# Patient Record
Sex: FEMALE | Race: WHITE | HISPANIC OR LATINO | URBAN - METROPOLITAN AREA
[De-identification: names, ages, dates, MRNs, and addresses within clinical notes are randomized per-mention and may not be internally consistent; named-entity substitution may affect disease eponyms.]

---

## 2021-08-13 ENCOUNTER — EMERGENCY (EMERGENCY)
Facility: HOSPITAL | Age: 26
LOS: 1 days | Discharge: HOME | End: 2021-08-13
Attending: STUDENT IN AN ORGANIZED HEALTH CARE EDUCATION/TRAINING PROGRAM | Admitting: STUDENT IN AN ORGANIZED HEALTH CARE EDUCATION/TRAINING PROGRAM
Payer: MEDICAID

## 2021-08-13 VITALS
OXYGEN SATURATION: 99 % | DIASTOLIC BLOOD PRESSURE: 63 MMHG | RESPIRATION RATE: 14 BRPM | SYSTOLIC BLOOD PRESSURE: 97 MMHG | HEART RATE: 98 BPM | TEMPERATURE: 98 F

## 2021-08-13 LAB
HCT VFR BLD CALC: 34.2 % — LOW (ref 34.5–45)
HGB BLD-MCNC: 11.5 G/DL — SIGNIFICANT CHANGE UP (ref 11.5–15.5)
IANC: 5.74 K/UL — SIGNIFICANT CHANGE UP (ref 1.5–8.5)
MCHC RBC-ENTMCNC: 33.4 PG — SIGNIFICANT CHANGE UP (ref 27–34)
MCHC RBC-ENTMCNC: 33.6 GM/DL — SIGNIFICANT CHANGE UP (ref 32–36)
MCV RBC AUTO: 99.4 FL — SIGNIFICANT CHANGE UP (ref 80–100)
PLATELET # BLD AUTO: 180 K/UL — SIGNIFICANT CHANGE UP (ref 150–400)
RBC # BLD: 3.44 M/UL — LOW (ref 3.8–5.2)
RBC # FLD: 12.8 % — SIGNIFICANT CHANGE UP (ref 10.3–14.5)
WBC # BLD: 7.43 K/UL — SIGNIFICANT CHANGE UP (ref 3.8–10.5)
WBC # FLD AUTO: 7.43 K/UL — SIGNIFICANT CHANGE UP (ref 3.8–10.5)

## 2021-08-13 PROCEDURE — 71045 X-RAY EXAM CHEST 1 VIEW: CPT | Mod: 26

## 2021-08-13 PROCEDURE — 99285 EMERGENCY DEPT VISIT HI MDM: CPT

## 2021-08-13 RX ORDER — ALBUTEROL 90 UG/1
4 AEROSOL, METERED ORAL ONCE
Refills: 0 | Status: COMPLETED | OUTPATIENT
Start: 2021-08-13 | End: 2021-08-13

## 2021-08-13 RX ORDER — SODIUM CHLORIDE 9 MG/ML
1000 INJECTION INTRAMUSCULAR; INTRAVENOUS; SUBCUTANEOUS ONCE
Refills: 0 | Status: COMPLETED | OUTPATIENT
Start: 2021-08-13 | End: 2021-08-13

## 2021-08-13 RX ADMIN — SODIUM CHLORIDE 1000 MILLILITER(S): 9 INJECTION INTRAMUSCULAR; INTRAVENOUS; SUBCUTANEOUS at 23:16

## 2021-08-13 RX ADMIN — ALBUTEROL 4 PUFF(S): 90 AEROSOL, METERED ORAL at 23:17

## 2021-08-13 NOTE — ED PROVIDER NOTE - PHYSICAL EXAMINATION
GENERAL: Awake. Alert. NAD. Well nourished.  HEENT: NC/AT, Conjunctiva pink, no scleral icterus. Airway patent.  LUNGS: CTAB. Decreased air movement b/l. No wheezes or rales noted.  CARDIAC: Chest non-tender to palpation. RRR. S1 and S2 intact. No murmurs noted.  ABDOMEN: No masses noted. Soft, NT, ND, no rebound, no guarding.  EXT: No edema, no calf tenderness, distal pulses 2+ bilaterally, no deformities.  NEURO: A&Ox3. Moving all extremities. Sensation and strength intact throughout.   SKIN: Warm and dry.   PSYCH: Normal affect.

## 2021-08-13 NOTE — ED PROVIDER NOTE - ATTENDING CONTRIBUTION TO CARE
I have personally performed a face to face medical and diagnostic evaluation of the patient. I have discussed with and reviewed the Resident's note and agree with the History, ROS, Physical Exam and MDM unless otherwise indicated. A brief summary of my personal evaluation and impression can be found below.    25yo F  27 wks pregnant, no pmx, COVID+ 3 days of fever (resolved), cough, sob, NBNB vomtiing yesterday, and myalgias. Noticed dec fetal movement today. No cp, leg pain/swelling, abd pain, vaginal bleeding. Not vaccinated.  VITALS: Initial triage and subsequent vitals have been reviewed by me.  Gen: Well appearing, NAD, alert, non-toxic  Head: NCAT  HEENT: MMM, normal conjunctiva, anicteric, neck supple  Lung: CTAB, no adventitious sounds  CV: RRR, no murmurs, 2+symmetric peripheral pulses  Abd: soft, NTND, no rebound or guarding, no palpable masses  MSK: No edema, no visible deformities  Neuro: Moving all extremity grossly, following commands appropriately, fluid speech  Skin: Warm and dry, no evidence of rash  Psych: normal mood and affect  COVID+ constitution of viral symptoms will get labs, ivf, cxr, and  huddle for NST given dec fetal movement.

## 2021-08-13 NOTE — ED ADULT TRIAGE NOTE - CHIEF COMPLAINT QUOTE
Pt is 27wks pregnant, presents to ED for SOB, fevers and decreased fetal movement since this morning. Pt states she tested positive for COVID 2 days ago. L&D triage RN called and states pt should be seen in ED.

## 2021-08-13 NOTE — ED PROVIDER NOTE - PROGRESS NOTE DETAILS
CAMILLA MARIO:  OB/GYN recommends no further intervention.  Ambulatory pulse ox 100%.  Pt medically stable for discharge.  Strict return precautions given.  Pt to follow up with PMD and OB/GYN.  Reassessment performed and plan for discharge discussed with Dr. Ortega who agrees with disposition and discharge plan. CAMILLA MARIO:  OB/GYN recommends no further intervention.  Ambulatory pulse ox 100% per ED Tech Teofilo Gonzalez.  Pt medically stable for discharge.  Strict return precautions given.  Pt to follow up with PMD and OB/GYN.  Reassessment performed and plan for discharge discussed with Dr. Ortega who agrees with disposition and discharge plan.  [employer]  ID#[id number].

## 2021-08-13 NOTE — ED PROVIDER NOTE - OBJECTIVE STATEMENT
26F  27 weeks pregnant, no significant PMH presenting for 3 days of SOB, productive cough (white sputum with small streaks of blood). Associated with fever, chills, nausea, NBNB vomiting, headache, myalgias. Pt tested positive for covid yesterday, noticed decreased fetal movement today. Denies neck pain, calf pain, calf swelling. Follows with OBGYN at [facility], wants to switch care to [facility] so she decided to come here.    Fetal HR done at bedside, 130.

## 2021-08-13 NOTE — ED PROVIDER NOTE - NSFOLLOWUPINSTRUCTIONS_ED_ALL_ED_FT
Advance activity as tolerated.  Continue all previously prescribed medications as directed unless otherwise instructed.  Take Tylenol 650mg (Two 325 mg pills) every 4-6 hours as needed for pain or fevers.  Use Albuterol Inhaler 2 puffs every 4 to 6 hours as needed for shortness of breath and/or wheezing.  Follow up with your primary care physician in 48-72 hours- bring copies of your results.  Return to the ER for worsening or persistent symptoms, including but not limited to worsening/persistent fevers, chest pain, shortness of breath, passing out, and/or ANY NEW OR CONCERNING SYMPTOMS. If you have issues obtaining follow up, please call: [phone] to obtain a doctor or specialist who takes your insurance in your area.  You may call [phone] to make an appointment with the internal medicine clinic.     You are suspected to have or have been diagnosed with the COVID-19 virus. You must self quarantine to complete a 14 day time period.  Monitor for fevers, shortness of breath and cough primarily.  Monitor your temperature daily to not any changes and increases.    It has been determined that you do not need hospitalization at this time and can recover while remaining in self-quarantine at home. You should follow the prevention steps below until a healthcare provider or local or state health department says you can return to your normal activities.  1. You should restrict activities outside your home, except for getting medical care.  2. Do not go to work, school, or public areas.  3. Avoid using public transportation, ride-sharing, or taxis.  4. Separate yourself from other people and animals in your home.  5. Call ahead before visiting your doctor.  6. Wear a facemask.  7. Cover your coughs and sneezes.  8. Clean your hands often.  9. Avoid sharing personal household items.  10. Clean all “high-touch” surfaces everyday.  11. Monitor your symptoms.  If you have a medical emergency and need to call 911, notify the dispatch personnel that you have COVID-19 If possible, put on a facemask before emergency medical services arrive.  12. Stopping home isolation.  Patients with confirmed COVID-19 should remain under home isolation precautions for 14 days since the positive COVID-19 test and until the risk of secondary transmission to others is thought to be low. The decision to discontinue home isolation precautions should be made on a case-by-case basis, in consultation with healthcare providers and state and local health departments. Your [facility] Department of Health can be reached at [phone] for further information about COVID-19.

## 2021-08-13 NOTE — ED ADULT NURSE NOTE - OBJECTIVE STATEMENT
Pt received to intake rm 8 c/o SOB x 3 days. AxOx4 and ambulatory at baseline, pt primarily Greenlandic speaking,  utilized at bedside by provider. Pt is ~27 weeks pregnant with first pregnancy, c/o SOB and productive cough at this time, pt tested covid + yesterday. Pt endorses decreased fetal movement today. Pt appears comfortable, in NAD, respirations even/unlabored, VS reassessed. Pt denies any PMH. 20G IV placed to L AC, labs drawn and sent, pt medicated as per orders, will continue to monitor.

## 2021-08-13 NOTE — ED PROVIDER NOTE - PATIENT PORTAL LINK FT
You can access the FollowMyHealth Patient Portal offered by [facility] by registering at the following website: [email or web address]. By joining [facility]’s FollowMyHealth portal, you will also be able to view your health information using other applications (apps) compatible with our system.

## 2021-08-13 NOTE — ED PROVIDER NOTE - CLINICAL SUMMARY MEDICAL DECISION MAKING FREE TEXT BOX
26F  27 weeks pregnant, no significant PMH presenting for 3 days of SOB, productive cough (white sputum with small streaks of blood). Associated with fever, chills, nausea, NBNB vomiting, headache, myalgias. Decreased fetal movement today.  HR 98, O2 100% on RA. Fetal . Lungs CTA, decreased air movement b/l.  COVID vs pneumonia vs URI  CXR, labs  Albuterol nebs  OB huddle  reassess

## 2021-08-13 NOTE — CONSULT NOTE ADULT - ASSESSMENT
25 y/o  @ 27w5d GA, presents with symptomatic covid and c/o decreased FM. Patient saturating well on room air. Other vitals stable. Maternal and fetal status reassuring.     -Patient cleared for d/c from an OB perspective   -Patient to follow-up in the office once she completes the appropriate period of quarantine   -Return precautions provided    D/w Dr. Cohlo Antunez PGY-4

## 2021-08-13 NOTE — CONSULT NOTE ADULT - SUBJECTIVE AND OBJECTIVE BOX
R4 GYN ED CONSULT NOTE    HPI:  27yo  @ 27w5d GA by EMRE 2021, PNC c/b covid positive status confirmed today per patient, presents c/o decreased FM. Patient reports she has had     Pt denies fever, chills, nausea, vomiting, diarrhea, headache, constipation, dizzyness, syncope, chest pain, palpitations, shortness of breath, dysuria, urgency, frequency, abdominal/pelvic pain, vaginal bleeding/discharge/odor/pain/itching, breast lumps/bumps, dyspareunia.    In ED today    Primary OB/GYN:    OBH:  GYNH: denies hx of fibroids, ov cysts, abnl paps, sti  PMSH:  MEDS: none  ALL: nkda  SOCH: denies t/e/d; safe at home  FAMH: denies fam hx of breast/uterine/ovarian/colon cancer    ROS: negative except per hpi    OBJECTIVE:    VITAL SIGNS:  Vital Signs Last 24 Hrs  T(C): 36.8 (13 Aug 2021 20:11), Max: 36.8 (13 Aug 2021 20:11)  T(F): 98.3 (13 Aug 2021 20:11), Max: 98.3 (13 Aug 2021 20:11)  HR: 89 (13 Aug 2021 23:23) (89 - 98)  BP: 105/56 (13 Aug 2021 23:23) (97/63 - 105/56)  BP(mean): --  RR: 16 (13 Aug 2021 23:23) (14 - 16)  SpO2: 99% (13 Aug 2021 23:23) (99% - 99%)    CAPILLARY BLOOD GLUCOSE            PHYSICAL EXAM:  GEN: NAD, A&Ox3  CV: RRR  LUNGS: CTAB  ABD: soft, NT, ND, +BS  SPECULUM:  PELVIC:  EXT: No LE edema/tenderness    LABS:                        11.5   7.43  )-----------( 180      ( 13 Aug 2021 23:41 )             34.2   baso x      eos x      imm gran x      lymph x      mono x      poly x                      RADIOLOGY: R4 GYN ED CONSULT NOTE    HPI:  27y/o  @ 27w5d GA by EMRE 2021, PNC c/b covid positive status confirmed today per patient, presents c/o decreased FM. Patient reports she has had     Pt denies fever, chills, nausea, vomiting, diarrhea, headache, constipation, dizzyness, syncope, chest pain, palpitations, shortness of breath, dysuria, urgency, frequency, abdominal/pelvic pain, vaginal bleeding/discharge/odor/pain/itching, breast lumps/bumps, dyspareunia.    In ED today    Primary OB/GYN:    OBH:  GYNH: denies hx of fibroids, ov cysts, abnl paps, sti  PMSH:  MEDS: none  ALL: nkda  SOCH: denies t/e/d; safe at home  FAMH: denies fam hx of breast/uterine/ovarian/colon cancer    ROS: negative except per hpi    OBJECTIVE:    VITAL SIGNS:  Vital Signs Last 24 Hrs  T(C): 36.8 (13 Aug 2021 20:11), Max: 36.8 (13 Aug 2021 20:11)  T(F): 98.3 (13 Aug 2021 20:11), Max: 98.3 (13 Aug 2021 20:11)  HR: 89 (13 Aug 2021 23:23) (89 - 98)  BP: 105/56 (13 Aug 2021 23:23) (97/63 - 105/56)  BP(mean): --  RR: 16 (13 Aug 2021 23:23) (14 - 16)  SpO2: 99% (13 Aug 2021 23:23) (99% - 99%)    CAPILLARY BLOOD GLUCOSE            PHYSICAL EXAM:  GEN: NAD, A&Ox3  CV: RRR  LUNGS: CTAB  ABD: soft, NT, ND, +BS  SPECULUM:  PELVIC:  EXT: No LE edema/tenderness    LABS:                        11.5   7.43  )-----------( 180      ( 13 Aug 2021 23:41 )             34.2   baso x      eos x      imm gran x      lymph x      mono x      poly x                      RADIOLOGY: R4 GYN ED CONSULT NOTE    HPI:  27 y/o  @ 27w5d GA by EMRE 2021, PNC c/b covid positive status confirmed today per patient, presents c/o decreased FM. Patient reports a 3-day history of SOB, malaise, generalized body aches, and fever. [facility] 2 days ago and was told today her result was positive. She denies associated palpitations and chest pain. She had an episode of emesis yesterday. Denies other OB complaints - no VB, LOF, ctx,.     PNC significant for anemia, patient on iron.     Primary OB/GYN: clinic at [facility]; patient made an appointment at the [facility] clinic  in order to transfer her care    OBH: G1  GYNH: denies hx of fibroids ovarian cysts, STIs; +abnormal pap this pregnancy, she will f/u postpartum  PMSH: fatty liver  MEDS: PNV, iron  ALL: nkda  SOCH: denies tobacco/drug/alcohol use    ROS: negative except per hpi    OBJECTIVE:    VITAL SIGNS:  Vital Signs Last 24 Hrs  T(C): 36.8 (13 Aug 2021 20:11), Max: 36.8 (13 Aug 2021 20:11)  T(F): 98.3 (13 Aug 2021 20:11), Max: 98.3 (13 Aug 2021 20:11)  HR: 89 (13 Aug 2021 23:23) (89 - 98)  BP: 105/56 (13 Aug 2021 23:23) (97/63 - 105/56)  BP(mean): --  RR: 16 (13 Aug 2021 23:23) (14 - 16)  SpO2: 99% (13 Aug 2021 23:23) (99% - 99%)      PHYSICAL EXAM:  GEN: NAD, A&Ox3  ABD: soft, NT, gravid  PELVIC: intentionally deferred  EXT: No LE edema/tenderness  NST: reactive - 130/mod/+accels/-deccels  BSS: breech, anterior fundal placenta, FHR+, BPP 8/8      LABS:                        11.5   7.43  )-----------( 180      ( 13 Aug 2021 23:41 )             34.2   baso x      eos x      imm gran x      lymph x      mono x      poly x

## 2021-08-14 VITALS — OXYGEN SATURATION: 100 % | HEART RATE: 98 BPM | RESPIRATION RATE: 16 BRPM

## 2021-08-14 LAB
ALBUMIN SERPL ELPH-MCNC: 3.4 G/DL — SIGNIFICANT CHANGE UP (ref 3.3–5)
ALP SERPL-CCNC: 131 U/L — HIGH (ref 40–120)
ALT FLD-CCNC: 21 U/L — SIGNIFICANT CHANGE UP (ref 4–33)
ANION GAP SERPL CALC-SCNC: 13 MMOL/L — SIGNIFICANT CHANGE UP (ref 7–14)
APPEARANCE UR: ABNORMAL
AST SERPL-CCNC: 20 U/L — SIGNIFICANT CHANGE UP (ref 4–32)
BACTERIA # UR AUTO: NEGATIVE — SIGNIFICANT CHANGE UP
BASOPHILS # BLD AUTO: 0.02 K/UL — SIGNIFICANT CHANGE UP (ref 0–0.2)
BASOPHILS NFR BLD AUTO: 0.3 % — SIGNIFICANT CHANGE UP (ref 0–2)
BILIRUB SERPL-MCNC: 0.3 MG/DL — SIGNIFICANT CHANGE UP (ref 0.2–1.2)
BILIRUB UR-MCNC: NEGATIVE — SIGNIFICANT CHANGE UP
BUN SERPL-MCNC: 8 MG/DL — SIGNIFICANT CHANGE UP (ref 7–23)
CALCIUM SERPL-MCNC: 9.1 MG/DL — SIGNIFICANT CHANGE UP (ref 8.4–10.5)
CHLORIDE SERPL-SCNC: 103 MMOL/L — SIGNIFICANT CHANGE UP (ref 98–107)
CO2 SERPL-SCNC: 18 MMOL/L — LOW (ref 22–31)
COLOR SPEC: YELLOW — SIGNIFICANT CHANGE UP
CREAT SERPL-MCNC: 0.45 MG/DL — LOW (ref 0.5–1.3)
DIFF PNL FLD: NEGATIVE — SIGNIFICANT CHANGE UP
EOSINOPHIL # BLD AUTO: 0.08 K/UL — SIGNIFICANT CHANGE UP (ref 0–0.5)
EOSINOPHIL NFR BLD AUTO: 1.1 % — SIGNIFICANT CHANGE UP (ref 0–6)
EPI CELLS # UR: 3 /HPF — SIGNIFICANT CHANGE UP (ref 0–5)
GLUCOSE SERPL-MCNC: 91 MG/DL — SIGNIFICANT CHANGE UP (ref 70–99)
GLUCOSE UR QL: NEGATIVE — SIGNIFICANT CHANGE UP
HYALINE CASTS # UR AUTO: 0 /LPF — SIGNIFICANT CHANGE UP (ref 0–7)
IMM GRANULOCYTES NFR BLD AUTO: 2.3 % — HIGH (ref 0–1.5)
KETONES UR-MCNC: NEGATIVE — SIGNIFICANT CHANGE UP
LEUKOCYTE ESTERASE UR-ACNC: NEGATIVE — SIGNIFICANT CHANGE UP
LYMPHOCYTES # BLD AUTO: 1.03 K/UL — SIGNIFICANT CHANGE UP (ref 1–3.3)
LYMPHOCYTES # BLD AUTO: 13.9 % — SIGNIFICANT CHANGE UP (ref 13–44)
MONOCYTES # BLD AUTO: 0.39 K/UL — SIGNIFICANT CHANGE UP (ref 0–0.9)
MONOCYTES NFR BLD AUTO: 5.2 % — SIGNIFICANT CHANGE UP (ref 2–14)
NEUTROPHILS # BLD AUTO: 5.74 K/UL — SIGNIFICANT CHANGE UP (ref 1.8–7.4)
NEUTROPHILS NFR BLD AUTO: 77.2 % — HIGH (ref 43–77)
NITRITE UR-MCNC: NEGATIVE — SIGNIFICANT CHANGE UP
NRBC # BLD: 0 /100 WBCS — SIGNIFICANT CHANGE UP
NRBC # FLD: 0 K/UL — SIGNIFICANT CHANGE UP
PH UR: 7.5 — SIGNIFICANT CHANGE UP (ref 5–8)
POTASSIUM SERPL-MCNC: 3.6 MMOL/L — SIGNIFICANT CHANGE UP (ref 3.5–5.3)
POTASSIUM SERPL-SCNC: 3.6 MMOL/L — SIGNIFICANT CHANGE UP (ref 3.5–5.3)
PROT SERPL-MCNC: 6.3 G/DL — SIGNIFICANT CHANGE UP (ref 6–8.3)
PROT UR-MCNC: ABNORMAL
RBC CASTS # UR COMP ASSIST: 1 /HPF — SIGNIFICANT CHANGE UP (ref 0–4)
SODIUM SERPL-SCNC: 134 MMOL/L — LOW (ref 135–145)
SP GR SPEC: 1.02 — SIGNIFICANT CHANGE UP (ref 1.01–1.02)
UROBILINOGEN FLD QL: SIGNIFICANT CHANGE UP
WBC UR QL: 3 /HPF — SIGNIFICANT CHANGE UP (ref 0–5)

## 2021-08-14 NOTE — PROVIDER CONTACT NOTE (OTHER) - ASSESSMENT
FHR baseline 130, moderate variability, + accels, - decels, no contractions noted.  Pt denies feeling contractions

## 2021-08-15 LAB
CULTURE RESULTS: SIGNIFICANT CHANGE UP
SPECIMEN SOURCE: SIGNIFICANT CHANGE UP

## 2021-08-18 ENCOUNTER — APPOINTMENT (OUTPATIENT)
Dept: INTERNAL MEDICINE | Facility: CLINIC | Age: 26
End: 2021-08-18

## 2021-08-20 ENCOUNTER — APPOINTMENT (OUTPATIENT)
Dept: INTERNAL MEDICINE | Facility: CLINIC | Age: 26
End: 2021-08-20

## 2021-08-24 ENCOUNTER — OUTPATIENT (OUTPATIENT)
Dept: OUTPATIENT SERVICES | Facility: HOSPITAL | Age: 26
LOS: 1 days | End: 2021-08-24

## 2021-08-24 ENCOUNTER — RESULT REVIEW (OUTPATIENT)
Age: 26
End: 2021-08-24

## 2021-08-24 ENCOUNTER — NON-APPOINTMENT (OUTPATIENT)
Age: 26
End: 2021-08-24

## 2021-08-24 ENCOUNTER — APPOINTMENT (OUTPATIENT)
Dept: OBGYN | Facility: HOSPITAL | Age: 26
End: 2021-08-24

## 2021-08-24 VITALS
DIASTOLIC BLOOD PRESSURE: 62 MMHG | SYSTOLIC BLOOD PRESSURE: 101 MMHG | BODY MASS INDEX: 27.49 KG/M2 | TEMPERATURE: 98.1 F | WEIGHT: 161 LBS | HEART RATE: 89 BPM | HEIGHT: 64 IN

## 2021-08-24 DIAGNOSIS — Z78.9 OTHER SPECIFIED HEALTH STATUS: ICD-10-CM

## 2021-08-24 DIAGNOSIS — Z23 ENCOUNTER FOR IMMUNIZATION: ICD-10-CM

## 2021-08-24 DIAGNOSIS — Z56.0 UNEMPLOYMENT, UNSPECIFIED: ICD-10-CM

## 2021-08-24 DIAGNOSIS — Z80.0 FAMILY HISTORY OF MALIGNANT NEOPLASM OF DIGESTIVE ORGANS: ICD-10-CM

## 2021-08-24 LAB
A1C WITH ESTIMATED AVERAGE GLUCOSE RESULT: 5.2 % — SIGNIFICANT CHANGE UP (ref 4–5.6)
ALBUMIN SERPL ELPH-MCNC: 3.7 G/DL — SIGNIFICANT CHANGE UP (ref 3.3–5)
ALP SERPL-CCNC: 166 U/L — HIGH (ref 40–120)
ALT FLD-CCNC: 29 U/L — SIGNIFICANT CHANGE UP (ref 4–33)
ANION GAP SERPL CALC-SCNC: 17 MMOL/L — HIGH (ref 7–14)
APPEARANCE UR: CLEAR — SIGNIFICANT CHANGE UP
AST SERPL-CCNC: 17 U/L — SIGNIFICANT CHANGE UP (ref 4–32)
BACTERIA # UR AUTO: NEGATIVE — SIGNIFICANT CHANGE UP
BASOPHILS # BLD AUTO: 0.03 K/UL — SIGNIFICANT CHANGE UP (ref 0–0.2)
BASOPHILS NFR BLD AUTO: 0.3 % — SIGNIFICANT CHANGE UP (ref 0–2)
BILIRUB SERPL-MCNC: 0.3 MG/DL — SIGNIFICANT CHANGE UP (ref 0.2–1.2)
BILIRUB UR-MCNC: NEGATIVE — SIGNIFICANT CHANGE UP
BUN SERPL-MCNC: 6 MG/DL — LOW (ref 7–23)
CALCIUM SERPL-MCNC: 9.3 MG/DL — SIGNIFICANT CHANGE UP (ref 8.4–10.5)
CHLORIDE SERPL-SCNC: 104 MMOL/L — SIGNIFICANT CHANGE UP (ref 98–107)
CO2 SERPL-SCNC: 17 MMOL/L — LOW (ref 22–31)
COLOR SPEC: YELLOW — SIGNIFICANT CHANGE UP
CREAT SERPL-MCNC: 0.46 MG/DL — LOW (ref 0.5–1.3)
DIFF PNL FLD: NEGATIVE — SIGNIFICANT CHANGE UP
EOSINOPHIL # BLD AUTO: 0.08 K/UL — SIGNIFICANT CHANGE UP (ref 0–0.5)
EOSINOPHIL NFR BLD AUTO: 0.9 % — SIGNIFICANT CHANGE UP (ref 0–6)
EPI CELLS # UR: 6 /HPF — HIGH (ref 0–5)
ESTIMATED AVERAGE GLUCOSE: 103 — SIGNIFICANT CHANGE UP
GLUCOSE 1H P MEAL SERPL-MCNC: 96 MG/DL — SIGNIFICANT CHANGE UP (ref 70–134)
GLUCOSE SERPL-MCNC: 97 MG/DL — SIGNIFICANT CHANGE UP (ref 70–99)
GLUCOSE UR QL: NEGATIVE — SIGNIFICANT CHANGE UP
HCT VFR BLD CALC: 35.5 % — SIGNIFICANT CHANGE UP (ref 34.5–45)
HGB BLD-MCNC: 12 G/DL — SIGNIFICANT CHANGE UP (ref 11.5–15.5)
HIV 1+2 AB+HIV1 P24 AG SERPL QL IA: SIGNIFICANT CHANGE UP
HYALINE CASTS # UR AUTO: 1 /LPF — SIGNIFICANT CHANGE UP (ref 0–7)
IANC: 6.91 K/UL — SIGNIFICANT CHANGE UP (ref 1.5–8.5)
IMM GRANULOCYTES NFR BLD AUTO: 3.9 % — HIGH (ref 0–1.5)
KETONES UR-MCNC: NEGATIVE — SIGNIFICANT CHANGE UP
LEUKOCYTE ESTERASE UR-ACNC: NEGATIVE — SIGNIFICANT CHANGE UP
LYMPHOCYTES # BLD AUTO: 1.07 K/UL — SIGNIFICANT CHANGE UP (ref 1–3.3)
LYMPHOCYTES # BLD AUTO: 12 % — LOW (ref 13–44)
MCHC RBC-ENTMCNC: 33.8 GM/DL — SIGNIFICANT CHANGE UP (ref 32–36)
MCHC RBC-ENTMCNC: 33.9 PG — SIGNIFICANT CHANGE UP (ref 27–34)
MCV RBC AUTO: 100.3 FL — HIGH (ref 80–100)
MONOCYTES # BLD AUTO: 0.49 K/UL — SIGNIFICANT CHANGE UP (ref 0–0.9)
MONOCYTES NFR BLD AUTO: 5.5 % — SIGNIFICANT CHANGE UP (ref 2–14)
NEUTROPHILS # BLD AUTO: 6.91 K/UL — SIGNIFICANT CHANGE UP (ref 1.8–7.4)
NEUTROPHILS NFR BLD AUTO: 77.4 % — HIGH (ref 43–77)
NITRITE UR-MCNC: NEGATIVE — SIGNIFICANT CHANGE UP
NRBC # BLD: 0 /100 WBCS — SIGNIFICANT CHANGE UP
NRBC # FLD: 0 K/UL — SIGNIFICANT CHANGE UP
PH UR: 6.5 — SIGNIFICANT CHANGE UP (ref 5–8)
PLATELET # BLD AUTO: 327 K/UL — SIGNIFICANT CHANGE UP (ref 150–400)
POTASSIUM SERPL-MCNC: 3.3 MMOL/L — LOW (ref 3.5–5.3)
POTASSIUM SERPL-SCNC: 3.3 MMOL/L — LOW (ref 3.5–5.3)
PROT SERPL-MCNC: 6.3 G/DL — SIGNIFICANT CHANGE UP (ref 6–8.3)
PROT UR-MCNC: ABNORMAL
RBC # BLD: 3.54 M/UL — LOW (ref 3.8–5.2)
RBC # FLD: 12.5 % — SIGNIFICANT CHANGE UP (ref 10.3–14.5)
RBC CASTS # UR COMP ASSIST: 4 /HPF — SIGNIFICANT CHANGE UP (ref 0–4)
SODIUM SERPL-SCNC: 138 MMOL/L — SIGNIFICANT CHANGE UP (ref 135–145)
SP GR SPEC: 1.02 — SIGNIFICANT CHANGE UP (ref 1.01–1.02)
T4 FREE SERPL-MCNC: 0.9 NG/DL — SIGNIFICANT CHANGE UP (ref 0.9–1.8)
TSH SERPL-MCNC: 2.7 UIU/ML — SIGNIFICANT CHANGE UP (ref 0.27–4.2)
URATE SERPL-MCNC: 3.8 MG/DL — SIGNIFICANT CHANGE UP (ref 2.5–7)
UROBILINOGEN FLD QL: SIGNIFICANT CHANGE UP
WBC # BLD: 8.93 K/UL — SIGNIFICANT CHANGE UP (ref 3.8–10.5)
WBC # FLD AUTO: 8.93 K/UL — SIGNIFICANT CHANGE UP (ref 3.8–10.5)
WBC UR QL: 6 /HPF — HIGH (ref 0–5)

## 2021-08-24 SDOH — ECONOMIC STABILITY - INCOME SECURITY: UNEMPLOYMENT, UNSPECIFIED: Z56.0

## 2021-08-25 DIAGNOSIS — Z34.90 ENCOUNTER FOR SUPERVISION OF NORMAL PREGNANCY, UNSPECIFIED, UNSPECIFIED TRIMESTER: ICD-10-CM

## 2021-08-25 DIAGNOSIS — Z78.9 OTHER SPECIFIED HEALTH STATUS: ICD-10-CM

## 2021-08-25 DIAGNOSIS — Z34.03 ENCOUNTER FOR SUPERVISION OF NORMAL FIRST PREGNANCY, THIRD TRIMESTER: ICD-10-CM

## 2021-08-25 DIAGNOSIS — R87.610 ATYPICAL SQUAMOUS CELLS OF UNDETERMINED SIGNIFICANCE ON CYTOLOGIC SMEAR OF CERVIX (ASC-US): ICD-10-CM

## 2021-08-25 DIAGNOSIS — R74.8 ABNORMAL LEVELS OF OTHER SERUM ENZYMES: ICD-10-CM

## 2021-08-25 DIAGNOSIS — Z23 ENCOUNTER FOR IMMUNIZATION: ICD-10-CM

## 2021-08-25 LAB
C TRACH RRNA SPEC QL NAA+PROBE: SIGNIFICANT CHANGE UP
COVID-19 NUCLEOCAPSID GAM AB INTERP: POSITIVE
COVID-19 NUCLEOCAPSID TOTAL GAM ANTIBODY RESULT: 14.4 INDEX — HIGH
CULTURE RESULTS: SIGNIFICANT CHANGE UP
HBV SURFACE AG SER-ACNC: SIGNIFICANT CHANGE UP
HCV AB S/CO SERPL IA: 0.12 S/CO — SIGNIFICANT CHANGE UP (ref 0–0.99)
HCV AB SERPL-IMP: SIGNIFICANT CHANGE UP
HEMOGLOBIN INTERPRETATION: SIGNIFICANT CHANGE UP
HGB A MFR BLD: 96.8 % — SIGNIFICANT CHANGE UP
HGB A2 MFR BLD: 2.8 % — SIGNIFICANT CHANGE UP (ref 2.4–3.5)
HGB F MFR BLD: <1 % — SIGNIFICANT CHANGE UP (ref 0–1.5)
LEAD BLD-MCNC: 1 UG/DL — SIGNIFICANT CHANGE UP (ref 0–4)
MEV IGG SER-ACNC: 19.2 AU/ML — SIGNIFICANT CHANGE UP
MEV IGG+IGM SER-IMP: POSITIVE — SIGNIFICANT CHANGE UP
N GONORRHOEA RRNA SPEC QL NAA+PROBE: SIGNIFICANT CHANGE UP
RUBV IGG SER-ACNC: 1.2 INDEX — SIGNIFICANT CHANGE UP
RUBV IGG SER-IMP: POSITIVE — SIGNIFICANT CHANGE UP
SARS-COV-2 IGG+IGM SERPL QL IA: 14.4 INDEX — HIGH
SARS-COV-2 IGG+IGM SERPL QL IA: POSITIVE
SPECIMEN SOURCE: SIGNIFICANT CHANGE UP
SPECIMEN SOURCE: SIGNIFICANT CHANGE UP
T PALLIDUM AB TITR SER: NEGATIVE — SIGNIFICANT CHANGE UP
VZV IGG FLD QL IA: 1709 INDEX — SIGNIFICANT CHANGE UP
VZV IGG FLD QL IA: POSITIVE — SIGNIFICANT CHANGE UP

## 2021-08-26 LAB
GAMMA INTERFERON BACKGROUND BLD IA-ACNC: 0.02 IU/ML — SIGNIFICANT CHANGE UP
M TB IFN-G BLD-IMP: NEGATIVE — SIGNIFICANT CHANGE UP
M TB IFN-G CD4+ BCKGRND COR BLD-ACNC: 0 IU/ML — SIGNIFICANT CHANGE UP
M TB IFN-G CD4+CD8+ BCKGRND COR BLD-ACNC: 0 IU/ML — SIGNIFICANT CHANGE UP
QUANT TB PLUS MITOGEN MINUS NIL: 2.03 IU/ML — SIGNIFICANT CHANGE UP

## 2021-08-27 LAB — 24R-OH-CALCIDIOL SERPL-MCNC: 26.4 NG/ML — LOW (ref 30–80)

## 2021-09-01 ENCOUNTER — NON-APPOINTMENT (OUTPATIENT)
Age: 26
End: 2021-09-01

## 2021-09-02 ENCOUNTER — APPOINTMENT (OUTPATIENT)
Dept: ANTEPARTUM | Facility: CLINIC | Age: 26
End: 2021-09-02
Payer: MEDICAID

## 2021-09-02 ENCOUNTER — ASOB RESULT (OUTPATIENT)
Age: 26
End: 2021-09-02

## 2021-09-02 PROCEDURE — 76805 OB US >/= 14 WKS SNGL FETUS: CPT | Mod: 26

## 2021-09-05 LAB — CYSTIC FIBROSIS EXPANDED PANEL: SIGNIFICANT CHANGE UP

## 2021-09-07 ENCOUNTER — NON-APPOINTMENT (OUTPATIENT)
Age: 26
End: 2021-09-07

## 2021-09-08 ENCOUNTER — APPOINTMENT (OUTPATIENT)
Dept: OBGYN | Facility: HOSPITAL | Age: 26
End: 2021-09-08

## 2021-09-08 ENCOUNTER — OUTPATIENT (OUTPATIENT)
Dept: OUTPATIENT SERVICES | Facility: HOSPITAL | Age: 26
LOS: 1 days | End: 2021-09-08

## 2021-09-08 VITALS
DIASTOLIC BLOOD PRESSURE: 65 MMHG | HEART RATE: 91 BPM | BODY MASS INDEX: 28.32 KG/M2 | TEMPERATURE: 98.4 F | WEIGHT: 165 LBS | SYSTOLIC BLOOD PRESSURE: 108 MMHG

## 2021-09-08 DIAGNOSIS — E55.9 VITAMIN D DEFICIENCY, UNSPECIFIED: ICD-10-CM

## 2021-09-08 RX ORDER — CHROMIUM 200 MCG
25 MCG TABLET ORAL
Qty: 30 | Refills: 3 | Status: ACTIVE | COMMUNITY
Start: 2021-09-08 | End: 1900-01-01

## 2021-09-08 RX ORDER — FAMOTIDINE 20 MG/1
20 TABLET, FILM COATED ORAL DAILY
Qty: 30 | Refills: 3 | Status: ACTIVE | COMMUNITY
Start: 2021-09-08 | End: 1900-01-01

## 2021-09-09 DIAGNOSIS — E55.9 VITAMIN D DEFICIENCY, UNSPECIFIED: ICD-10-CM

## 2021-09-09 DIAGNOSIS — Z34.03 ENCOUNTER FOR SUPERVISION OF NORMAL FIRST PREGNANCY, THIRD TRIMESTER: ICD-10-CM

## 2021-09-23 ENCOUNTER — APPOINTMENT (OUTPATIENT)
Dept: OBGYN | Facility: HOSPITAL | Age: 26
End: 2021-09-23

## 2021-09-28 ENCOUNTER — APPOINTMENT (OUTPATIENT)
Dept: OBGYN | Facility: HOSPITAL | Age: 26
End: 2021-09-28

## 2021-09-28 ENCOUNTER — NON-APPOINTMENT (OUTPATIENT)
Age: 26
End: 2021-09-28

## 2021-09-28 ENCOUNTER — OUTPATIENT (OUTPATIENT)
Dept: OUTPATIENT SERVICES | Facility: HOSPITAL | Age: 26
LOS: 1 days | End: 2021-09-28

## 2021-09-28 VITALS
HEART RATE: 89 BPM | HEIGHT: 64 IN | BODY MASS INDEX: 28.42 KG/M2 | DIASTOLIC BLOOD PRESSURE: 62 MMHG | TEMPERATURE: 98.2 F | SYSTOLIC BLOOD PRESSURE: 105 MMHG | WEIGHT: 166.5 LBS

## 2021-09-28 DIAGNOSIS — Z34.03 ENCOUNTER FOR SUPERVISION OF NORMAL FIRST PREGNANCY, THIRD TRIMESTER: ICD-10-CM

## 2021-10-28 ENCOUNTER — APPOINTMENT (OUTPATIENT)
Dept: ANTEPARTUM | Facility: CLINIC | Age: 26
End: 2021-10-28
Payer: MEDICAID

## 2021-10-28 ENCOUNTER — RESULT REVIEW (OUTPATIENT)
Age: 26
End: 2021-10-28

## 2021-10-28 ENCOUNTER — OUTPATIENT (OUTPATIENT)
Dept: OUTPATIENT SERVICES | Facility: HOSPITAL | Age: 26
LOS: 1 days | End: 2021-10-28

## 2021-10-28 ENCOUNTER — APPOINTMENT (OUTPATIENT)
Dept: OBGYN | Facility: HOSPITAL | Age: 26
End: 2021-10-28

## 2021-10-28 ENCOUNTER — ASOB RESULT (OUTPATIENT)
Age: 26
End: 2021-10-28

## 2021-10-28 VITALS — HEART RATE: 73 BPM | SYSTOLIC BLOOD PRESSURE: 111 MMHG | DIASTOLIC BLOOD PRESSURE: 79 MMHG

## 2021-10-28 DIAGNOSIS — Z34.03 ENCOUNTER FOR SUPERVISION OF NORMAL FIRST PREGNANCY, THIRD TRIMESTER: ICD-10-CM

## 2021-10-28 LAB
BASOPHILS # BLD AUTO: 0.04 K/UL — SIGNIFICANT CHANGE UP (ref 0–0.2)
BASOPHILS NFR BLD AUTO: 0.4 % — SIGNIFICANT CHANGE UP (ref 0–2)
EOSINOPHIL # BLD AUTO: 0.12 K/UL — SIGNIFICANT CHANGE UP (ref 0–0.5)
EOSINOPHIL NFR BLD AUTO: 1.3 % — SIGNIFICANT CHANGE UP (ref 0–6)
HCT VFR BLD CALC: 39.9 % — SIGNIFICANT CHANGE UP (ref 34.5–45)
HGB BLD-MCNC: 14.2 G/DL — SIGNIFICANT CHANGE UP (ref 11.5–15.5)
HIV 1+2 AB+HIV1 P24 AG SERPL QL IA: SIGNIFICANT CHANGE UP
IANC: 7.83 K/UL — SIGNIFICANT CHANGE UP (ref 1.5–8.5)
IMM GRANULOCYTES NFR BLD AUTO: 0.9 % — SIGNIFICANT CHANGE UP (ref 0–1.5)
LYMPHOCYTES # BLD AUTO: 0.72 K/UL — LOW (ref 1–3.3)
LYMPHOCYTES # BLD AUTO: 7.8 % — LOW (ref 13–44)
MCHC RBC-ENTMCNC: 34.1 PG — HIGH (ref 27–34)
MCHC RBC-ENTMCNC: 35.6 GM/DL — SIGNIFICANT CHANGE UP (ref 32–36)
MCV RBC AUTO: 95.9 FL — SIGNIFICANT CHANGE UP (ref 80–100)
MONOCYTES # BLD AUTO: 0.49 K/UL — SIGNIFICANT CHANGE UP (ref 0–0.9)
MONOCYTES NFR BLD AUTO: 5.3 % — SIGNIFICANT CHANGE UP (ref 2–14)
NEUTROPHILS # BLD AUTO: 7.83 K/UL — HIGH (ref 1.8–7.4)
NEUTROPHILS NFR BLD AUTO: 84.3 % — HIGH (ref 43–77)
NRBC # BLD: 0 /100 WBCS — SIGNIFICANT CHANGE UP
NRBC # FLD: 0 K/UL — SIGNIFICANT CHANGE UP
PLATELET # BLD AUTO: 162 K/UL — SIGNIFICANT CHANGE UP (ref 150–400)
RBC # BLD: 4.16 M/UL — SIGNIFICANT CHANGE UP (ref 3.8–5.2)
RBC # FLD: 12.5 % — SIGNIFICANT CHANGE UP (ref 10.3–14.5)
WBC # BLD: 9.28 K/UL — SIGNIFICANT CHANGE UP (ref 3.8–10.5)
WBC # FLD AUTO: 9.28 K/UL — SIGNIFICANT CHANGE UP (ref 3.8–10.5)

## 2021-10-28 PROCEDURE — 76816 OB US FOLLOW-UP PER FETUS: CPT | Mod: 26

## 2021-10-28 PROCEDURE — 76818 FETAL BIOPHYS PROFILE W/NST: CPT | Mod: 26

## 2021-10-29 LAB
C TRACH RRNA SPEC QL NAA+PROBE: SIGNIFICANT CHANGE UP
COVID-19 NUCLEOCAPSID GAM AB INTERP: POSITIVE
COVID-19 NUCLEOCAPSID TOTAL GAM ANTIBODY RESULT: 26.7 INDEX — HIGH
N GONORRHOEA RRNA SPEC QL NAA+PROBE: SIGNIFICANT CHANGE UP
SARS-COV-2 IGG+IGM SERPL QL IA: 26.7 INDEX — HIGH
SARS-COV-2 IGG+IGM SERPL QL IA: POSITIVE
SPECIMEN SOURCE: SIGNIFICANT CHANGE UP

## 2021-11-01 ENCOUNTER — NON-APPOINTMENT (OUTPATIENT)
Age: 26
End: 2021-11-01

## 2021-11-01 LAB
CULTURE RESULTS: SIGNIFICANT CHANGE UP
SPECIMEN SOURCE: SIGNIFICANT CHANGE UP

## 2021-11-02 ENCOUNTER — APPOINTMENT (OUTPATIENT)
Dept: OBGYN | Facility: HOSPITAL | Age: 26
End: 2021-11-02

## 2021-11-02 ENCOUNTER — RESULT REVIEW (OUTPATIENT)
Age: 26
End: 2021-11-02

## 2021-11-02 ENCOUNTER — OUTPATIENT (OUTPATIENT)
Dept: OUTPATIENT SERVICES | Facility: HOSPITAL | Age: 26
LOS: 1 days | End: 2021-11-02

## 2021-11-02 VITALS
SYSTOLIC BLOOD PRESSURE: 117 MMHG | HEIGHT: 64 IN | BODY MASS INDEX: 28.65 KG/M2 | DIASTOLIC BLOOD PRESSURE: 72 MMHG | TEMPERATURE: 98.1 F | WEIGHT: 167.8 LBS | HEART RATE: 85 BPM

## 2021-11-02 DIAGNOSIS — L29.3 ANOGENITAL PRURITUS, UNSPECIFIED: ICD-10-CM

## 2021-11-02 DIAGNOSIS — Z00.00 ENCOUNTER FOR GENERAL ADULT MEDICAL EXAMINATION W/OUT ABNORMAL FINDINGS: ICD-10-CM

## 2021-11-02 DIAGNOSIS — Z20.822 CONTACT WITH AND (SUSPECTED) EXPOSURE TO COVID-19: ICD-10-CM

## 2021-11-02 DIAGNOSIS — Z34.93 ENCOUNTER FOR SUPERVISION OF NORMAL PREGNANCY, UNSPECIFIED, THIRD TRIMESTER: ICD-10-CM

## 2021-11-02 LAB
ALBUMIN SERPL ELPH-MCNC: 3.5 G/DL — SIGNIFICANT CHANGE UP (ref 3.3–5)
ALP SERPL-CCNC: 401 U/L — HIGH (ref 40–120)
ALT FLD-CCNC: 128 U/L — HIGH (ref 4–33)
AST SERPL-CCNC: 55 U/L — HIGH (ref 4–32)
BILIRUB DIRECT SERPL-MCNC: 0.6 MG/DL — HIGH (ref 0–0.2)
BILIRUB INDIRECT FLD-MCNC: 0.4 MG/DL — SIGNIFICANT CHANGE UP (ref 0–1)
BILIRUB SERPL-MCNC: 1 MG/DL — SIGNIFICANT CHANGE UP (ref 0.2–1.2)
GLUCOSE 1H P MEAL SERPL-MCNC: 98 MG/DL — SIGNIFICANT CHANGE UP (ref 70–134)
PROT SERPL-MCNC: 7 G/DL — SIGNIFICANT CHANGE UP (ref 6–8.3)
SARS-COV-2 RNA SPEC QL NAA+PROBE: SIGNIFICANT CHANGE UP

## 2021-11-02 RX ORDER — URSODIOL 300 MG/1
300 CAPSULE ORAL TWICE DAILY
Qty: 30 | Refills: 0 | Status: COMPLETED | COMMUNITY
Start: 2021-11-02 | End: 2021-11-17

## 2021-11-03 ENCOUNTER — NON-APPOINTMENT (OUTPATIENT)
Age: 26
End: 2021-11-03

## 2021-11-03 ENCOUNTER — INPATIENT (INPATIENT)
Facility: HOSPITAL | Age: 26
LOS: 2 days | Discharge: HOME HEALTH | End: 2021-11-06
Attending: SPECIALIST | Admitting: SPECIALIST
Payer: MEDICAID

## 2021-11-03 VITALS
TEMPERATURE: 98 F | DIASTOLIC BLOOD PRESSURE: 74 MMHG | HEART RATE: 73 BPM | SYSTOLIC BLOOD PRESSURE: 112 MMHG | RESPIRATION RATE: 16 BRPM

## 2021-11-03 DIAGNOSIS — O26.899 OTHER SPECIFIED PREGNANCY RELATED CONDITIONS, UNSPECIFIED TRIMESTER: ICD-10-CM

## 2021-11-03 DIAGNOSIS — Z3A.00 WEEKS OF GESTATION OF PREGNANCY NOT SPECIFIED: ICD-10-CM

## 2021-11-03 LAB
BASOPHILS # BLD AUTO: 0.03 K/UL — SIGNIFICANT CHANGE UP (ref 0–0.2)
BASOPHILS NFR BLD AUTO: 0.4 % — SIGNIFICANT CHANGE UP (ref 0–2)
EOSINOPHIL # BLD AUTO: 0.16 K/UL — SIGNIFICANT CHANGE UP (ref 0–0.5)
EOSINOPHIL NFR BLD AUTO: 2 % — SIGNIFICANT CHANGE UP (ref 0–6)
HCT VFR BLD CALC: 41.6 % — SIGNIFICANT CHANGE UP (ref 34.5–45)
HGB BLD-MCNC: 13.6 G/DL — SIGNIFICANT CHANGE UP (ref 11.5–15.5)
IANC: 6.55 K/UL — SIGNIFICANT CHANGE UP (ref 1.5–8.5)
IMM GRANULOCYTES NFR BLD AUTO: 0.6 % — SIGNIFICANT CHANGE UP (ref 0–1.5)
LYMPHOCYTES # BLD AUTO: 0.92 K/UL — LOW (ref 1–3.3)
LYMPHOCYTES # BLD AUTO: 11.3 % — LOW (ref 13–44)
MCHC RBC-ENTMCNC: 32.7 GM/DL — SIGNIFICANT CHANGE UP (ref 32–36)
MCHC RBC-ENTMCNC: 33.3 PG — SIGNIFICANT CHANGE UP (ref 27–34)
MCV RBC AUTO: 102 FL — HIGH (ref 80–100)
MONOCYTES # BLD AUTO: 0.41 K/UL — SIGNIFICANT CHANGE UP (ref 0–0.9)
MONOCYTES NFR BLD AUTO: 5 % — SIGNIFICANT CHANGE UP (ref 2–14)
NEUTROPHILS # BLD AUTO: 6.55 K/UL — SIGNIFICANT CHANGE UP (ref 1.8–7.4)
NEUTROPHILS NFR BLD AUTO: 80.7 % — HIGH (ref 43–77)
NRBC # BLD: 0 /100 WBCS — SIGNIFICANT CHANGE UP
NRBC # FLD: 0 K/UL — SIGNIFICANT CHANGE UP
PLATELET # BLD AUTO: 153 K/UL — SIGNIFICANT CHANGE UP (ref 150–400)
RBC # BLD: 4.08 M/UL — SIGNIFICANT CHANGE UP (ref 3.8–5.2)
RBC # FLD: 12.9 % — SIGNIFICANT CHANGE UP (ref 10.3–14.5)
WBC # BLD: 8.12 K/UL — SIGNIFICANT CHANGE UP (ref 3.8–10.5)
WBC # FLD AUTO: 8.12 K/UL — SIGNIFICANT CHANGE UP (ref 3.8–10.5)

## 2021-11-03 RX ORDER — INFLUENZA VIRUS VACCINE 15; 15; 15; 15 UG/.5ML; UG/.5ML; UG/.5ML; UG/.5ML
0.5 SUSPENSION INTRAMUSCULAR ONCE
Refills: 0 | Status: DISCONTINUED | OUTPATIENT
Start: 2021-11-03 | End: 2021-11-06

## 2021-11-03 RX ORDER — OXYTOCIN 10 UNIT/ML
333.33 VIAL (ML) INJECTION
Qty: 20 | Refills: 0 | Status: DISCONTINUED | OUTPATIENT
Start: 2021-11-03 | End: 2021-11-06

## 2021-11-03 RX ORDER — SODIUM CHLORIDE 9 MG/ML
1000 INJECTION, SOLUTION INTRAVENOUS
Refills: 0 | Status: DISCONTINUED | OUTPATIENT
Start: 2021-11-03 | End: 2021-11-05

## 2021-11-03 NOTE — OB PROVIDER TRIAGE NOTE - HISTORY OF PRESENT ILLNESS
27 y/o pt 38.6 weeks  presents with c/o decreased fetal movement x1 day and painful contractions. pt reports that she feels baby move but it is less than normal. pt reports contractions every 5-10 minutes and reports 8/10 pain. pt denies any lof or bleeding. pt denies n/v/d, fever or chills. pt endorses +fetal movement  IOL 10/04/21 for LGA   Ap complicated by:  -LGA; AC >97th percentile and EFW >90th percentile     NKDA  PMH:   Anemia  PSH: denies  OB: denies  GYN:  Abnormal pap- f/u PP  denies fibroids, cysts or STDs  Social/psych hx:  denies ETOH, illicit drug use or smoking  Medications:  PNV

## 2021-11-03 NOTE — OB RN TRIAGE NOTE - TEMPERATURE IN CELSIUS (DEGREES C)
Refill request received from pharmacy.   Medication refilled per protocol.   Script sent to pharmacy.   36.6

## 2021-11-03 NOTE — OB RN TRIAGE NOTE - NS_BABIESUTERO_OBGYN_ALL_OB_NU
Infant has roomed in with mother this shift except when medically indicated. Benefits of rooming in provided. 1

## 2021-11-03 NOTE — OB RN PATIENT PROFILE - NS_SUPPORTPERSONNAME_OBGYN_ALL_OB_FT
jose antonio [facility] [phone] jose antonio [facility] [phone]

## 2021-11-03 NOTE — OB PROVIDER TRIAGE NOTE - NSOBPROVIDERNOTE_OBGYN_ALL_OB_FT
25 y/o pt 38.6 weeks  presents in labor   d/w Dr Frankel and Dr Gomez  Plan:  -Admit l&d. Routine labs  -Expectant management of labor  -Fetus: cat 1 tracing, vertex presentation, continuous monitoring  -GBS negative  -Pain: patient denies pain management at this time  -Covid 19 negative 21  -Consents signed and witnessed at bedside 25 y/o pt 38.6 weeks  presents in labor   d/w Dr Frankel and Dr Gomez  Plan:  -Admit l&d. Routine labs  -Expectant management of labor  -Fetus: cat 1 tracing, vertex presentation, continuous monitoring  -GBS negative  -Pain: patient denies pain management at this time  -Covid 19 negative 21  -Consents signed and witnessed at bedside via  #[id number]

## 2021-11-03 NOTE — OB PROVIDER H&P - PROBLEM SELECTOR PLAN 1
-Admit l&d. Routine labs  -Expectant management of labor  -Fetus: cat 1 tracing, vertex presentation, continuous monitoring  -GBS negative  -Pain: patient denies pain management at this time  -Covid 19 negative 04/02/21  -Consents signed and witnessed at bedside via  #

## 2021-11-03 NOTE — OB PROVIDER H&P - ASSESSMENT
27 y/o pt 38.6 weeks  presents in labor   d/w Dr Frankel and Dr Gomez  Plan:  -Admit l&d. Routine labs  -Expectant management of labor  -Fetus: cat 1 tracing, vertex presentation, continuous monitoring  -GBS negative  -Pain: patient denies pain management at this time  -Covid 19 negative 21  -Consents signed and witnessed at bedside via  #[id number]

## 2021-11-03 NOTE — OB PROVIDER TRIAGE NOTE - NSHPPHYSICALEXAM_GEN_ALL_CORE
Vital Signs Last 24 Hrs  T(C): 36.6 (03 Nov 2021 18:19), Max: 36.6 (03 Nov 2021 17:59)  T(F): 97.88 (03 Nov 2021 18:19), Max: 97.9 (03 Nov 2021 17:59)  HR: 73 (03 Nov 2021 17:59) (73 - 73)  BP: 112/74 (03 Nov 2021 17:59) (112/74 - 112/74)  BP(mean): --  RR: 16 (03 Nov 2021 17:59) (16 - 16)  SpO2: --    ABdomen: soft, non tender. no guarding or rebound tenderness  SVE: 4/80/-3  TAS: vertex presentation   EFW 4196gm by Leopold's (brendano on 10/30/21 4046gm)    NST in progress

## 2021-11-04 ENCOUNTER — APPOINTMENT (OUTPATIENT)
Dept: ANTEPARTUM | Facility: CLINIC | Age: 26
End: 2021-11-04

## 2021-11-04 ENCOUNTER — NON-APPOINTMENT (OUTPATIENT)
Age: 26
End: 2021-11-04

## 2021-11-04 LAB
BILE AC FLD-MCNC: 110.9 UMOL/L — HIGH (ref 0–10)
COVID-19 SPIKE DOMAIN AB INTERP: POSITIVE
COVID-19 SPIKE DOMAIN ANTIBODY RESULT: 63.6 U/ML — HIGH
SARS-COV-2 IGG+IGM SERPL QL IA: 63.6 U/ML — HIGH
SARS-COV-2 IGG+IGM SERPL QL IA: POSITIVE
T PALLIDUM AB TITR SER: NEGATIVE — SIGNIFICANT CHANGE UP

## 2021-11-04 PROCEDURE — 59409 OBSTETRICAL CARE: CPT | Mod: U9,UB,GC

## 2021-11-04 RX ORDER — ACETAMINOPHEN 500 MG
650 TABLET ORAL ONCE
Refills: 0 | Status: DISCONTINUED | OUTPATIENT
Start: 2021-11-04 | End: 2021-11-06

## 2021-11-04 RX ORDER — OXYTOCIN 10 UNIT/ML
333.33 VIAL (ML) INJECTION
Qty: 20 | Refills: 0 | Status: DISCONTINUED | OUTPATIENT
Start: 2021-11-04 | End: 2021-11-06

## 2021-11-04 RX ORDER — MAGNESIUM HYDROXIDE 400 MG/1
30 TABLET, CHEWABLE ORAL
Refills: 0 | Status: DISCONTINUED | OUTPATIENT
Start: 2021-11-04 | End: 2021-11-06

## 2021-11-04 RX ORDER — DIPHENHYDRAMINE HCL 50 MG
25 CAPSULE ORAL EVERY 6 HOURS
Refills: 0 | Status: DISCONTINUED | OUTPATIENT
Start: 2021-11-04 | End: 2021-11-06

## 2021-11-04 RX ORDER — OXYTOCIN 10 UNIT/ML
2 VIAL (ML) INJECTION
Qty: 30 | Refills: 0 | Status: DISCONTINUED | OUTPATIENT
Start: 2021-11-04 | End: 2021-11-06

## 2021-11-04 RX ORDER — AMPICILLIN TRIHYDRATE 250 MG
2 CAPSULE ORAL EVERY 6 HOURS
Refills: 0 | Status: DISCONTINUED | OUTPATIENT
Start: 2021-11-05 | End: 2021-11-05

## 2021-11-04 RX ORDER — LANOLIN
1 OINTMENT (GRAM) TOPICAL EVERY 6 HOURS
Refills: 0 | Status: DISCONTINUED | OUTPATIENT
Start: 2021-11-04 | End: 2021-11-06

## 2021-11-04 RX ORDER — ACETAMINOPHEN 500 MG
975 TABLET ORAL
Refills: 0 | Status: DISCONTINUED | OUTPATIENT
Start: 2021-11-04 | End: 2021-11-06

## 2021-11-04 RX ORDER — KETOROLAC TROMETHAMINE 30 MG/ML
30 SYRINGE (ML) INJECTION ONCE
Refills: 0 | Status: DISCONTINUED | OUTPATIENT
Start: 2021-11-04 | End: 2021-11-04

## 2021-11-04 RX ORDER — DIPHENHYDRAMINE HCL 50 MG
25 CAPSULE ORAL ONCE
Refills: 0 | Status: COMPLETED | OUTPATIENT
Start: 2021-11-04 | End: 2021-11-04

## 2021-11-04 RX ORDER — AER TRAVELER 0.5 G/1
1 SOLUTION RECTAL; TOPICAL EVERY 4 HOURS
Refills: 0 | Status: DISCONTINUED | OUTPATIENT
Start: 2021-11-04 | End: 2021-11-06

## 2021-11-04 RX ORDER — SODIUM CHLORIDE 9 MG/ML
3 INJECTION INTRAMUSCULAR; INTRAVENOUS; SUBCUTANEOUS EVERY 8 HOURS
Refills: 0 | Status: DISCONTINUED | OUTPATIENT
Start: 2021-11-04 | End: 2021-11-06

## 2021-11-04 RX ORDER — SIMETHICONE 80 MG/1
80 TABLET, CHEWABLE ORAL EVERY 4 HOURS
Refills: 0 | Status: DISCONTINUED | OUTPATIENT
Start: 2021-11-04 | End: 2021-11-06

## 2021-11-04 RX ORDER — GENTAMICIN SULFATE 40 MG/ML
310 VIAL (ML) INJECTION ONCE
Refills: 0 | Status: COMPLETED | OUTPATIENT
Start: 2021-11-04 | End: 2021-11-04

## 2021-11-04 RX ORDER — IBUPROFEN 200 MG
600 TABLET ORAL EVERY 6 HOURS
Refills: 0 | Status: COMPLETED | OUTPATIENT
Start: 2021-11-04 | End: 2022-10-03

## 2021-11-04 RX ORDER — SODIUM CHLORIDE 9 MG/ML
500 INJECTION, SOLUTION INTRAVENOUS ONCE
Refills: 0 | Status: COMPLETED | OUTPATIENT
Start: 2021-11-04 | End: 2021-11-04

## 2021-11-04 RX ORDER — TETANUS TOXOID, REDUCED DIPHTHERIA TOXOID AND ACELLULAR PERTUSSIS VACCINE, ADSORBED 5; 2.5; 8; 8; 2.5 [IU]/.5ML; [IU]/.5ML; UG/.5ML; UG/.5ML; UG/.5ML
0.5 SUSPENSION INTRAMUSCULAR ONCE
Refills: 0 | Status: DISCONTINUED | OUTPATIENT
Start: 2021-11-04 | End: 2021-11-06

## 2021-11-04 RX ORDER — OXYCODONE HYDROCHLORIDE 5 MG/1
5 TABLET ORAL
Refills: 0 | Status: DISCONTINUED | OUTPATIENT
Start: 2021-11-04 | End: 2021-11-06

## 2021-11-04 RX ORDER — BENZOCAINE 10 %
1 GEL (GRAM) MUCOUS MEMBRANE EVERY 6 HOURS
Refills: 0 | Status: DISCONTINUED | OUTPATIENT
Start: 2021-11-04 | End: 2021-11-06

## 2021-11-04 RX ORDER — AMPICILLIN TRIHYDRATE 250 MG
CAPSULE ORAL
Refills: 0 | Status: DISCONTINUED | OUTPATIENT
Start: 2021-11-04 | End: 2021-11-05

## 2021-11-04 RX ORDER — OXYCODONE HYDROCHLORIDE 5 MG/1
5 TABLET ORAL ONCE
Refills: 0 | Status: DISCONTINUED | OUTPATIENT
Start: 2021-11-04 | End: 2021-11-06

## 2021-11-04 RX ORDER — PRAMOXINE HYDROCHLORIDE 150 MG/15G
1 AEROSOL, FOAM RECTAL EVERY 4 HOURS
Refills: 0 | Status: DISCONTINUED | OUTPATIENT
Start: 2021-11-04 | End: 2021-11-06

## 2021-11-04 RX ORDER — HYDROCORTISONE 1 %
1 OINTMENT (GRAM) TOPICAL EVERY 6 HOURS
Refills: 0 | Status: DISCONTINUED | OUTPATIENT
Start: 2021-11-04 | End: 2021-11-06

## 2021-11-04 RX ORDER — SODIUM CHLORIDE 9 MG/ML
1000 INJECTION, SOLUTION INTRAVENOUS ONCE
Refills: 0 | Status: DISCONTINUED | OUTPATIENT
Start: 2021-11-04 | End: 2021-11-06

## 2021-11-04 RX ORDER — DIBUCAINE 1 %
1 OINTMENT (GRAM) RECTAL EVERY 6 HOURS
Refills: 0 | Status: DISCONTINUED | OUTPATIENT
Start: 2021-11-04 | End: 2021-11-06

## 2021-11-04 RX ORDER — AMPICILLIN TRIHYDRATE 250 MG
2 CAPSULE ORAL ONCE
Refills: 0 | Status: COMPLETED | OUTPATIENT
Start: 2021-11-04 | End: 2021-11-04

## 2021-11-04 RX ADMIN — SODIUM CHLORIDE 125 MILLILITER(S): 9 INJECTION, SOLUTION INTRAVENOUS at 06:47

## 2021-11-04 RX ADMIN — Medication 1000 MILLIUNIT(S)/MIN: at 22:27

## 2021-11-04 RX ADMIN — Medication 2 MILLIUNIT(S)/MIN: at 08:48

## 2021-11-04 RX ADMIN — Medication 30 MILLIGRAM(S): at 23:55

## 2021-11-04 RX ADMIN — Medication 216 GRAM(S): at 18:38

## 2021-11-04 RX ADMIN — Medication 500 MILLIGRAM(S): at 19:40

## 2021-11-04 RX ADMIN — Medication 25 MILLIGRAM(S): at 16:39

## 2021-11-04 RX ADMIN — SODIUM CHLORIDE 1000 MILLILITER(S): 9 INJECTION, SOLUTION INTRAVENOUS at 15:25

## 2021-11-04 NOTE — OB PROVIDER LABOR PROGRESS NOTE - NS_OBIHIFHRDETAILS_OBGYN_ALL_OB_FT
130s, moderate variability, accels, no decels
160/mod/+accles no decels
125/mod/+accels no decels
baseline 130,moderate variability, +accels, + intermittent late decels
130/mod amari/- accels/early decels
baseline 145 ,moderate variability, +accels, +intermittent variable decels

## 2021-11-04 NOTE — OB PROVIDER LABOR PROGRESS NOTE - ASSESSMENT
Pt tolerated exam well.    - Continue expectant management.    Amyeo Afroz Jereen, PGY-1  dw Dr Hyman
25 y/o  @39w in labor     unchanged exam  IUPC placed without incident  Evaluate ctx pattern to start pitocin  DW Dr Antunez PGY4    cassie, NP
- IUPC placed   - Continue Pitocin    Gabi Shankar, PGY1  d/w Dr. Antunez 
25 y/o  @39w in labor augmented with pitocin  - cervix more effaced but edematous anteriorly  - contractions inadequate  - benadryl for cervical swelling  - will re-evaluate around 6P and if no cervical change will likely need  delivery for arrest of dilation    DW Dr Antunez and Dr. Ольга Ferris, PGY-2
25 y/o  @39w in labor augmented with pitocin making cervical change, edematous atnerior lip  - for repeat VE at 7PM, if unchanged patient will need  delivery  - T 38.5, given prolonged rupture since 4:30AM, multiple cervical exams, despite abscence of maternal and/or fetal tachycardia, suspicion for IAI is high and patient will likely benefit from treatment  - Patient to get Amp/gent/tylenol and 1LR bolus     DW Dr. Philip and Dr. Adithya Ferris, PGY-2
Allow pt to labor down.    Amyeo Afroz Jereen, PGY-1  d/w Dr Loyola

## 2021-11-04 NOTE — OB RN DELIVERY SUMMARY - NSSELHIDDEN_OBGYN_ALL_OB_FT
[NS_DeliveryAttending1_OBGYN_ALL_OB_FT:[id number]],[NS_DeliveryRN_OBGYN_ALL_OB_FT:[id number]=]

## 2021-11-04 NOTE — OB PROVIDER LABOR PROGRESS NOTE - NS_SUBJECTIVE/OBJECTIVE_OBGYN_ALL_OB_FT
Patient seen and re-examined for cervical change due to increased discomfort not relieved with top-off    Vital Signs Last 24 Hrs  T(C): 36.6 (04 Nov 2021 17:27), Max: 37.1 (04 Nov 2021 06:30)  T(F): 97.88 (04 Nov 2021 17:27), Max: 98.78 (04 Nov 2021 06:30)  HR: 94 (04 Nov 2021 18:11) (64 - 114)  BP: 121/65 (04 Nov 2021 18:09) (96/55 - 148/-)  BP(mean): --  RR: 16 (04 Nov 2021 06:30) (16 - 19)  SpO2: 98% (04 Nov 2021 18:11) (92% - 100%)
Pt seen for cervical evaluation. Pt has mild pressure
Pt due for exam. Feeling pressure.
Patient seen and re-examined for cervical change    Vital Signs Last 24 Hrs  T(C): 37.0 (04 Nov 2021 15:25), Max: 37.1 (04 Nov 2021 06:30)  T(F): 98.6 (04 Nov 2021 15:25), Max: 98.78 (04 Nov 2021 06:30)  HR: 75 (04 Nov 2021 16:16) (64 - 114)  BP: 137/87 (04 Nov 2021 15:54) (96/55 - 137/87)  BP(mean): --  RR: 16 (04 Nov 2021 06:30) (16 - 19)  SpO2: 99% (04 Nov 2021 15:46) (92% - 100%)
VE to evaluate progress in labor. IUPC had fallen out.
Pt due for VE.

## 2021-11-04 NOTE — OB PROVIDER LABOR PROGRESS NOTE - NSVAGINALEXAM_OBGYN_ALL_OB_DT
04-Nov-2021 16:28
04-Nov-2021 18:16
04-Nov-2021 20:04
04-Nov-2021 02:21
04-Nov-2021 08:07
04-Nov-2021 12:30

## 2021-11-04 NOTE — CHART NOTE - NSCHARTNOTEFT_GEN_A_CORE
R4  Patient examined for cervical change  VE: 7/70/-3  EFM:  [place]:  - AROM, clear fluid  - cw exp mgmt    H Artemio PGY4  d/w Dr. Erwin

## 2021-11-04 NOTE — OB RN DELIVERY SUMMARY - NS_SEPSISRSKCALC_OBGYN_ALL_OB_FT
EOS calculated successfully. EOS Risk Factor: 0.5/1000 live births ([facility] national incidence); GA=39w;Temp=101.3; ROM=16.917; GBS='Negative'; Antibiotics='Broad spectrum antibiotics 2-3.9 hrs prior to birth'

## 2021-11-05 ENCOUNTER — TRANSCRIPTION ENCOUNTER (OUTPATIENT)
Age: 26
End: 2021-11-05

## 2021-11-05 ENCOUNTER — NON-APPOINTMENT (OUTPATIENT)
Age: 26
End: 2021-11-05

## 2021-11-05 LAB
ALBUMIN SERPL ELPH-MCNC: 2.5 G/DL — LOW (ref 3.3–5)
ALP SERPL-CCNC: 228 U/L — HIGH (ref 40–120)
ALT FLD-CCNC: 60 U/L — HIGH (ref 4–33)
AST SERPL-CCNC: 33 U/L — HIGH (ref 4–32)
BILIRUB DIRECT SERPL-MCNC: 0.2 MG/DL — SIGNIFICANT CHANGE UP (ref 0–0.2)
BILIRUB INDIRECT FLD-MCNC: 0.2 MG/DL — SIGNIFICANT CHANGE UP (ref 0–1)
BILIRUB SERPL-MCNC: 0.4 MG/DL — SIGNIFICANT CHANGE UP (ref 0.2–1.2)
HCT VFR BLD CALC: 27.6 % — LOW (ref 34.5–45)
HGB BLD-MCNC: 9.2 G/DL — LOW (ref 11.5–15.5)
MCHC RBC-ENTMCNC: 33.3 GM/DL — SIGNIFICANT CHANGE UP (ref 32–36)
MCHC RBC-ENTMCNC: 33.5 PG — SIGNIFICANT CHANGE UP (ref 27–34)
MCV RBC AUTO: 100.4 FL — HIGH (ref 80–100)
NRBC # BLD: 0 /100 WBCS — SIGNIFICANT CHANGE UP
NRBC # FLD: 0 K/UL — SIGNIFICANT CHANGE UP
PLATELET # BLD AUTO: 131 K/UL — LOW (ref 150–400)
PROT SERPL-MCNC: 5.2 G/DL — LOW (ref 6–8.3)
RBC # BLD: 2.75 M/UL — LOW (ref 3.8–5.2)
RBC # FLD: 13.1 % — SIGNIFICANT CHANGE UP (ref 10.3–14.5)
WBC # BLD: 20.16 K/UL — HIGH (ref 3.8–10.5)
WBC # FLD AUTO: 20.16 K/UL — HIGH (ref 3.8–10.5)

## 2021-11-05 RX ORDER — NORETHINDRONE 0.35 MG/1
1 TABLET ORAL
Qty: 90 | Refills: 2
Start: 2021-11-05

## 2021-11-05 RX ORDER — IBUPROFEN 200 MG
600 TABLET ORAL EVERY 6 HOURS
Refills: 0 | Status: DISCONTINUED | OUTPATIENT
Start: 2021-11-05 | End: 2021-11-06

## 2021-11-05 RX ADMIN — Medication 600 MILLIGRAM(S): at 07:05

## 2021-11-05 RX ADMIN — Medication 216 GRAM(S): at 16:31

## 2021-11-05 RX ADMIN — Medication 975 MILLIGRAM(S): at 09:12

## 2021-11-05 RX ADMIN — SODIUM CHLORIDE 3 MILLILITER(S): 9 INJECTION INTRAMUSCULAR; INTRAVENOUS; SUBCUTANEOUS at 14:00

## 2021-11-05 RX ADMIN — Medication 975 MILLIGRAM(S): at 09:42

## 2021-11-05 RX ADMIN — Medication 216 GRAM(S): at 03:01

## 2021-11-05 RX ADMIN — Medication 216 GRAM(S): at 09:11

## 2021-11-05 RX ADMIN — Medication 1 TABLET(S): at 16:31

## 2021-11-05 RX ADMIN — Medication 975 MILLIGRAM(S): at 03:48

## 2021-11-05 RX ADMIN — Medication 600 MILLIGRAM(S): at 16:31

## 2021-11-05 RX ADMIN — Medication 600 MILLIGRAM(S): at 17:01

## 2021-11-05 RX ADMIN — Medication 975 MILLIGRAM(S): at 03:01

## 2021-11-05 RX ADMIN — Medication 600 MILLIGRAM(S): at 06:30

## 2021-11-05 RX ADMIN — SODIUM CHLORIDE 3 MILLILITER(S): 9 INJECTION INTRAMUSCULAR; INTRAVENOUS; SUBCUTANEOUS at 04:30

## 2021-11-05 NOTE — PROGRESS NOTE ADULT - SUBJECTIVE AND OBJECTIVE BOX
post epidural d/c follow-up:     Pt states ambulating and freely voiding, denies headache/nausea/vomiting.  vss.  Primarily Yi speaking.   at bedside to assist.  Both state no concerns at this time.    ICU Vital Signs Last 24 Hrs  T(C): 36.7 (05 Nov 2021 14:18), Max: 38.5 (04 Nov 2021 18:05)  T(F): 98.1 (05 Nov 2021 14:18), Max: 101.3 (04 Nov 2021 18:05)  HR: 76 (05 Nov 2021 14:18) (67 - 115)  BP: 101/61 (05 Nov 2021 14:18) (101/61 - 148/-)  BP(mean): --  ABP: --  ABP(mean): --  RR: 18 (05 Nov 2021 14:18) (18 - 18)  SpO2: 100% (05 Nov 2021 14:18) (91% - 100%)

## 2021-11-05 NOTE — PROGRESS NOTE ADULT - ASSESSMENT
A/P: 25yo PPD#1 s/p . Intrapartum course complicated by an intrapartum fever of 38.5 on  at 1815. Pt s/p Ampicillin, gentamycin, Tylenol. Patient is stable and doing well post-partum.   #Intrapartum Fever   - s/p ampicillin, gentamycin, tylenol   - pt afebrile since  - WBC 8.12  - continue to monitor for s/s of infection     #PP Care   - Pain well controlled, continue current pain regimen  - Increase ambulation, SCDs when not ambulating  - Continue regular diet  - Pt desires progesterone only pills, Rx sent to pharmacy   - Pt desires COVID-19 vaccine. Ordered.     Gabi Shankar, PGY1 A/P: 25yo PPD#1 s/p . Intrapartum course complicated by an intrapartum fever of 38.5 on  at 1815. Pt s/p Ampicillin, gentamycin, Tylenol. Prenatal course complicated by elevated bile acid (110) and elevated LFTs AST/ALT 55/128. Presumed ICP per clinic records. Pt had complaint of Patient is stable and doing well post-partum.   #Intrapartum Fever   - s/p ampicillin, gentamycin, tylenol   - pt afebrile since  - WBC 8.12 on admission   - continue to monitor for s/s of infection   - f/u STAT CBC     #ICP  - f/u STAT bile acids  - f/u hepatic function tests  to trend LFTs    #PP Care   - Pain well controlled, continue current pain regimen  - Increase ambulation, SCDs when not ambulating  - Continue regular diet  - Pt desires progesterone only pills, Rx sent to pharmacy   - Pt desires COVID-19 vaccine. Ordered.     Gabi Shankar, PGY1 A/P: 27yo PPD#1 s/p . Intrapartum course complicated by an intrapartum fever of 38.5 on  at 1815. Pt s/p Ampicillin, gentamycin, Tylenol. Prenatal course complicated by elevated bile acid (110) and elevated LFTs AST/ALT 55/128 on . Presumed ICP per clinic records. Pt had complaint of Patient is stable and doing well post-partum.   #Intrapartum Fever   - s/p ampicillin, gentamycin, tylenol   - pt afebrile since  - WBC 8.12 on admission   - continue to monitor for s/s of infection   - f/u STAT CBC     #ICP  - AST/ALT 55/128 ()  -  ()  - f/u STAT bile acids  - f/u hepatic function tests  to trend LFTs    #PP Care   - Pain well controlled, continue current pain regimen  - Increase ambulation, SCDs when not ambulating  - Continue regular diet  - Pt desires progesterone only pills, Rx sent to pharmacy   - Pt desires COVID-19 vaccine. Ordered.     Gabi Shankar, PGY1

## 2021-11-05 NOTE — DISCHARGE NOTE OB - PROVIDER TOKENS
FREE:[LAST:[[facility]],PHONE:[phone]],FAX:[(   [phone]    -],ADDRESS:[[facility]   Basement floor, oncology building  [facility]     Follow up in 6 weeks]]

## 2021-11-05 NOTE — DISCHARGE NOTE OB - PLAN OF CARE
Make your follow-up appointment with your doctor as ordered.   No heavy lifting, driving, or strenuous activity for 6 weeks.  Nothing per vagina such as tampons, intercourse, douches or tub baths for 6 weeks or until you see your doctor.  Call your doctor if you're unable to tolerate food, increase in vaginal bleeding, or have difficulty urinating. Call your doctor if you have pain that is not relieved by your perscribed medications. Notify your doctor with any other concerns. Make your follow-up appointment with your doctor as ordered.   No heavy lifting, driving, or strenuous activity for 6 weeks.  Nothing per vagina such as tampons, intercourse, douches or tub baths for 6 weeks or until you see your doctor.  Call your doctor if you're unable to tolerate food, increase in vaginal bleeding, or have difficulty urinating. Call your doctor if you have pain that is not relieved by your prescribed medications. Notify your doctor with any other concerns.

## 2021-11-05 NOTE — PROGRESS NOTE ADULT - ATTENDING COMMENTS
Associate Chief of L & D (Late entry)    I have not met this patient before today.  She was admitted by Dr Gomez and delivered by Dr Gallegos.  she presented with decrease fetal movement.  Due to the elevated LFT's in the notes reviewed her records and at her last clinic appointment, she had increased pruritis and labs were sent.  She presented as above and the results of her labs was marked elevated  and increase LFT's.  During labor, she had an intrapartum temp to 38.5    OB Progress Note:  PPD#1    S: 27yo  PPD#1 s/p . Patient denies CP/SOB. Denies lightheadedness/dizziness. Denies N/V. reports thtat the itching has improved    O:  Vitals:  Vital Signs Last 24 Hrs  T(C): 36.7 (2021 14:18), Max: 38.5 (2021 18:05)  T(F): 98.1 (2021 14:18), Max: 101.3 (2021 18:05)  HR: 76 (2021 14:18) (67 - 115)  BP: 101/61 (2021 14:18) (101/61 - 148/-)  RR: 18 (2021 14:18) (18 - 18)  SpO2: 100% (2021 14:18) (91% - 100%)    MEDICATIONS  (STANDING):  acetaminophen     Tablet .. 650 milliGRAM(s) Oral once  acetaminophen     Tablet .. 975 milliGRAM(s) Oral <User Schedule>  ampicillin  IVPB 2 Gram(s) IV Intermittent every 6 hours  ampicillin  IVPB      diphtheria/tetanus/pertussis (acellular) Vaccine (ADAcel) 0.5 milliLiter(s) IntraMuscular once  ibuprofen  Tablet. 600 milliGRAM(s) Oral every 6 hours  influenza   Vaccine 0.5 milliLiter(s) IntraMuscular once  lactated ringers Bolus 1000 milliLiter(s) IV Bolus once  oxytocin Infusion 333.333 milliUNIT(s)/Min (1000 mL/Hr) IV Continuous <Continuous>  oxytocin Infusion 333.333 milliUNIT(s)/Min (1000 mL/Hr) IV Continuous <Continuous>  oxytocin Infusion. 2 milliUNIT(s)/Min (2 mL/Hr) IV Continuous <Continuous>  prenatal multivitamin 1 Tablet(s) Oral daily  sodium chloride 0.9% lock flush 3 milliLiter(s) IV Push every 8 hours      Labs:  Blood type: O Positive  Rubella IgG: RPR: Negative                          13.6   8.12 >-----------< 153    (  @ 22:48 )             41.6          TPro  7.0  /  Alb  3.5  /  TBili  1.0  /  DBili  0.6<H>  /  AST  55<H>  /  ALT  128<H>  /  AlkPhos  401<H>  21 @ 19:44          Physical Exam:  General: NAD  Abdomen: soft, non-tender, non-distended, fundus firm  Vaginal: Lochia wnl  Extremities: No erythema/edema    A/P: 27yo PPD#1 s/p  and repair of RML and isolated intrapartum temp and elevated LFT's in the setting of marked elevated BA.  - Pain well controlled, continue current pain regimen  - Increase ambulation, SCDs when not ambulating  - Continue regular diet  - Follow CBC  follow LFT's    Dipika Brennan M.D., M.B.A., M.S.

## 2021-11-05 NOTE — DISCHARGE NOTE OB - MEDICATION SUMMARY - MEDICATIONS TO TAKE
I will START or STAY ON the medications listed below when I get home from the hospital:    Tylenol 325 mg oral tablet  -- 2 tab(s) by mouth every 6 hours  -- Indication: For pain    ibuprofen 600 mg oral tablet  -- 1 tab(s) by mouth every 6 hours  -- Indication: For pain    lanolin topical ointment  -- 1 application on skin every 6 hours, As needed, nipple soreness  -- Indication: For nipple pain    witch hazel 50% topical pad  -- 1 application on skin every 4 hours, As needed, Perineal discomfort  -- Indication: For perineal pain    PNV Prenatal oral tablet  -- 1 tab(s) by mouth once a day  -- Indication: For home medication    ferrous sulfate 325 mg (65 mg elemental iron) oral tablet  -- orally once a day  -- Indication: For anemia    simethicone 80 mg oral tablet, chewable  -- 1 tab(s) by mouth every 4 hours, As needed, Gas  -- Indication: For gas pain    norethindrone 0.35 mg oral tablet  -- 1 tab(s) by mouth once a day MDD:one tab per day  -- Do not take this drug if you are pregnant.  It is very important that you take or use this exactly as directed.  Do not skip doses or discontinue unless directed by your doctor.    -- Indication: For contraception

## 2021-11-05 NOTE — OB PROVIDER DELIVERY SUMMARY - NSPROVIDERDELIVERYNOTE_OBGYN_ALL_OB_FT
Spontaneous vaginal delivery of liveborn infant from [place]. Head, shoulders and body delivered easily with an right mediolateral episiotomy (RML). Infant was suctioned and handed off to mother/peds. Placenta delivered intact with a 3 vessel cord. Fundal massage was given and uterine fundus was found to be firm. Vaginal exam revealed an intact cervix, vaginal walls and sulci. Laceration along RML was noted and repaired using 2.0 chromic suture. Excellent hemostasis noted. Patient was stable. Count was correct x2. AGPARS 9/9 BW 9lb 3oz Spontaneous vaginal delivery of liveborn infant from [place]. Head, shoulders and body delivered easily with an right mediolateral episiotomy (RML). Infant was suctioned and handed off to mother/peds. Placenta delivered intact with a 3 vessel cord. Fundal massage was given and uterine fundus was found to be firm. Vaginal exam revealed an intact cervix, vaginal walls and sulci. Laceration along RML was noted and repaired using 2.0 chromic suture. Excellent hemostasis noted. Patient was stable. Count was correct x2. AGPARS 9/9 BW 9lb 0oz Spontaneous vaginal delivery of liveborn infant from [place]. Head, shoulders and body delivered easily with an right mediolateral episiotomy (RML). Infant was suctioned and handed off to mother/peds. Placenta delivered intact with a 3 vessel cord. Fundal massage was given and uterine fundus was found to be firm. Vaginal exam revealed an intact cervix, vaginal walls and sulci. Laceration along RML was noted and repaired using 2.0 chromic suture. Excellent hemostasis noted. Patient was stable. Count was correct x2. AGPARS 7/8 BW 9lb 0oz Spontaneous vaginal delivery of liveborn male infant from [place] position over RML episiotomy. After delivery of the head, there was a brief shoulder dystocia relieved with Randall maneuver and suprapubic pressure.  Infant was suctioned and handed off to mother/peds.Delayed cord clamping was performed.  Placenta delivered intact with a 3 vessel cord. Fundal massage was given and uterine fundus was found to be firm after very brief period of atony.  Vaginal exam revealed an intact cervix, vaginal walls and sulci.  RML was noted and repaired using 2.0 chromic suture. Excellent hemostasis noted. Patient was stable. Count was correct x2. AGPARS 7/8 BW 9lb 0oz  had normal and symmetrical movement of both arms.     Marisol Gallegos MD

## 2021-11-05 NOTE — DISCHARGE NOTE OB - COMMUNITY RESOURCE NAME:
Patient will call and schedule a postpartum  appointment for 4 to 6 weeks after delivery date at [facility]  [phone].

## 2021-11-05 NOTE — OB PROVIDER DELIVERY SUMMARY - NSSELHIDDEN_OBGYN_ALL_OB_FT
[NS_DeliveryAttending1_OBGYN_ALL_OB_FT:[id number]],[NS_DeliveryRN_OBGYN_ALL_OB_FT:[id number]]

## 2021-11-05 NOTE — DISCHARGE NOTE OB - COMMUNITY RESOURCE CONTACT NUMBER:
Patient to call and schedule baby's first visit appointment at  [facility]: Division of General Pediatrics [address], [address] [place] [address]  ([phone]) so that baby is evaluated by pediatrician 1 to 2 days after hospital discharge.

## 2021-11-05 NOTE — DISCHARGE NOTE OB - CARE PROVIDER_API CALL
[facility] BRUCE,   [facility]   Basement floor, oncology building  [facility]     Follow up in 6 weeks  Phone: [phone]  Fax: (   )    -  Follow Up Time:

## 2021-11-05 NOTE — DISCHARGE NOTE OB - PATIENT PORTAL LINK FT
You can access the FollowMyHealth Patient Portal offered by [facility] by registering at the following website: [email or web address]. By joining [employer]’s FollowMyHealth portal, you will also be able to view your health information using other applications (apps) compatible with our system.

## 2021-11-05 NOTE — OB NEONATOLOGY/PEDIATRICIAN DELIVERY SUMMARY - NSPEDSNEONOTESA_OBGYN_ALL_OB_FT
Baby is a 39.1 wk GA male born to a 26 y/o  mother via . PEDS called to delivery for category II FHT and concern for shoulder dystocia. Maternal history of depression, no meds or therapy. Prenatal history uncomplicated. Maternal blood type O+. PNL negative, non-reactive, and immune. GBS negative on 10/28. AROM at 04:35 on , light mec fluids. Baby born vigorous and crying spontaneously. No shoulder dystocia at delivery. Warmed, dried, stimulated. Apgars 7/8. EOS 0.59, maternal Tmax 38.5 deg C. Treated with amp x1 at 18:38 and gent x1 at 19:40Mom plans to breastfeed and consents hepB. Circ requested. BW: 4090, LGA. Baby is a 39.1 wk GA male born to a 24 y/o  mother via . PEDS called to delivery for category II FHT and concern for shoulder dystocia. Maternal history of depression, no meds or therapy. Prenatal history uncomplicated. Maternal blood type O+. PNL negative, non-reactive, and immune. GBS negative on 10/28. AROM at 04:35 on , light mec fluids. Baby born vigorous and crying spontaneously. No shoulder dystocia at delivery. Warmed, dried, stimulated. Apgars 7/8. EOS 0.62, maternal Tmax 38.5 deg C. Treated with amp x1 at 18:38 and gent x1 at 19:40. Mom plans to breastfeed and consents hepB. Circ requested. BW: 4090, LGA.

## 2021-11-05 NOTE — PROGRESS NOTE ADULT - SUBJECTIVE AND OBJECTIVE BOX
OB Progress Note:  PPD#1    S: 25yo  PPD#1 s/p . Intrapartum course complicated by an intrapartum fever of 38.5 on  at 1815. Pt s/p Ampicillin, gentamycin, Tylenol. Patient feels well. Pain is well controlled, tolerating regular diet, passing flatus, voiding spontaneously, ambulating without difficulty. Denies heavy vaginal bleeding, CP/SOB, N/V, lightheadedness/dizziness. Counseled on contraception options and discussed risk and benefits, questions answered, pt desires progesterone only pills. Pt desires COVID-19 vaccine. Discussed risks and benefits questions answered.     O:  Vitals:  Vital Signs Last 24 Hrs  T(C): 36.8 (2021 06:25), Max: 38.5 (2021 18:05)  T(F): 98.2 (2021 06:25), Max: 101.3 (2021 18:05)  HR: 98 (2021 06:25) (64 - 115)  BP: 117/68 (2021 06:25) (96/55 - 148/-)  BP(mean): --  RR: 18 (2021 06:25) (18 - 18)  SpO2: 100% (2021 06:25) (91% - 100%)    MEDICATIONS  (STANDING):  acetaminophen     Tablet .. 650 milliGRAM(s) Oral once  acetaminophen     Tablet .. 975 milliGRAM(s) Oral <User Schedule>  ampicillin  IVPB 2 Gram(s) IV Intermittent every 6 hours  ampicillin  IVPB      diphtheria/tetanus/pertussis (acellular) Vaccine (ADAcel) 0.5 milliLiter(s) IntraMuscular once  ibuprofen  Tablet. 600 milliGRAM(s) Oral every 6 hours  influenza   Vaccine 0.5 milliLiter(s) IntraMuscular once  lactated ringers Bolus 1000 milliLiter(s) IV Bolus once  oxytocin Infusion 333.333 milliUNIT(s)/Min (1000 mL/Hr) IV Continuous <Continuous>  oxytocin Infusion 333.333 milliUNIT(s)/Min (1000 mL/Hr) IV Continuous <Continuous>  oxytocin Infusion. 2 milliUNIT(s)/Min (2 mL/Hr) IV Continuous <Continuous>  prenatal multivitamin 1 Tablet(s) Oral daily  sodium chloride 0.9% lock flush 3 milliLiter(s) IV Push every 8 hours      Labs:  Blood type: O Positive  Rubella IgG: RPR: Negative                          13.6   8.12 >-----------< 153    (  @ 22:48 )             41.6          TPro  7.0  /  Alb  3.5  /  TBili  1.0  /  DBili  0.6<H>  /  AST  55<H>  /  ALT  128<H>  /  AlkPhos  401<H>  21 @ 19:44          Physical Exam:  General: NAD  Abdomen: soft, non-tender, non-distended, fundus firm  Vaginal: No heavy vaginal bleeding  Extremities: No erythema, no calf tenderness      OB Progress Note:  PPD#1    S: 27yo  PPD#1 s/p . Intrapartum course complicated by an intrapartum fever of 38.5 on  at 1815. Pt s/p Ampicillin, gentamycin, Tylenol. Prenatal course complicated by elevated bile acid (110) and elevated LFTs AST/ALT 55/128. Today patient feels well. Pain is well controlled, tolerating regular diet, passing flatus, voiding spontaneously, ambulating without difficulty. Denies heavy vaginal bleeding, CP/SOB, N/V, lightheadedness/dizziness. Counseled on contraception options and discussed risk and benefits, questions answered, pt desires progesterone only pills. Pt desires COVID-19 vaccine. Discussed risks and benefits questions answered.     O:  Vitals:  Vital Signs Last 24 Hrs  T(C): 36.8 (2021 06:25), Max: 38.5 (2021 18:05)  T(F): 98.2 (2021 06:25), Max: 101.3 (2021 18:05)  HR: 98 (2021 06:25) (64 - 115)  BP: 117/68 (2021 06:25) (96/55 - 148/-)  BP(mean): --  RR: 18 (2021 06:25) (18 - 18)  SpO2: 100% (2021 06:25) (91% - 100%)    MEDICATIONS  (STANDING):  acetaminophen     Tablet .. 650 milliGRAM(s) Oral once  acetaminophen     Tablet .. 975 milliGRAM(s) Oral <User Schedule>  ampicillin  IVPB 2 Gram(s) IV Intermittent every 6 hours  ampicillin  IVPB      diphtheria/tetanus/pertussis (acellular) Vaccine (ADAcel) 0.5 milliLiter(s) IntraMuscular once  ibuprofen  Tablet. 600 milliGRAM(s) Oral every 6 hours  influenza   Vaccine 0.5 milliLiter(s) IntraMuscular once  lactated ringers Bolus 1000 milliLiter(s) IV Bolus once  oxytocin Infusion 333.333 milliUNIT(s)/Min (1000 mL/Hr) IV Continuous <Continuous>  oxytocin Infusion 333.333 milliUNIT(s)/Min (1000 mL/Hr) IV Continuous <Continuous>  oxytocin Infusion. 2 milliUNIT(s)/Min (2 mL/Hr) IV Continuous <Continuous>  prenatal multivitamin 1 Tablet(s) Oral daily  sodium chloride 0.9% lock flush 3 milliLiter(s) IV Push every 8 hours      Labs:  Blood type: O Positive  Rubella IgG: RPR: Negative                          13.6   8.12 >-----------< 153    (  @ 22:48 )             41.6          TPro  7.0  /  Alb  3.5  /  TBili  1.0  /  DBili  0.6<H>  /  AST  55<H>  /  ALT  128<H>  /  AlkPhos  401<H>  21 @ 19:44          Physical Exam:  General: NAD  Abdomen: soft, non-tender, non-distended, fundus firm  Vaginal: No heavy vaginal bleeding  Extremities: No erythema, no calf tenderness      OB Progress Note:  PPD#1    S: 25yo  PPD#1 s/p . Intrapartum course complicated by an intrapartum fever of 38.5 on  at 1815. Pt s/p Ampicillin, gentamycin, Tylenol. Prenatal course complicated by generalized pruritus on , pt found to have elevated bile acid (110 on ) and elevated LFTs AST/ALT 55/128 (). Today patient feels well. Pain is well controlled, tolerating regular diet, passing flatus, voiding spontaneously, ambulating without difficulty. Denies heavy vaginal bleeding, CP/SOB, N/V, lightheadedness/dizziness. Counseled on contraception options and discussed risk and benefits, questions answered, pt desires progesterone only pills. Pt desires COVID-19 vaccine. Discussed risks and benefits questions answered.     O:  Vitals:  Vital Signs Last 24 Hrs  T(C): 36.8 (2021 06:25), Max: 38.5 (2021 18:05)  T(F): 98.2 (2021 06:25), Max: 101.3 (2021 18:05)  HR: 98 (2021 06:25) (64 - 115)  BP: 117/68 (2021 06:25) (96/55 - 148/-)  BP(mean): --  RR: 18 (2021 06:25) (18 - 18)  SpO2: 100% (2021 06:25) (91% - 100%)    MEDICATIONS  (STANDING):  acetaminophen     Tablet .. 650 milliGRAM(s) Oral once  acetaminophen     Tablet .. 975 milliGRAM(s) Oral <User Schedule>  ampicillin  IVPB 2 Gram(s) IV Intermittent every 6 hours  ampicillin  IVPB      diphtheria/tetanus/pertussis (acellular) Vaccine (ADAcel) 0.5 milliLiter(s) IntraMuscular once  ibuprofen  Tablet. 600 milliGRAM(s) Oral every 6 hours  influenza   Vaccine 0.5 milliLiter(s) IntraMuscular once  lactated ringers Bolus 1000 milliLiter(s) IV Bolus once  oxytocin Infusion 333.333 milliUNIT(s)/Min (1000 mL/Hr) IV Continuous <Continuous>  oxytocin Infusion 333.333 milliUNIT(s)/Min (1000 mL/Hr) IV Continuous <Continuous>  oxytocin Infusion. 2 milliUNIT(s)/Min (2 mL/Hr) IV Continuous <Continuous>  prenatal multivitamin 1 Tablet(s) Oral daily  sodium chloride 0.9% lock flush 3 milliLiter(s) IV Push every 8 hours      Labs:  Blood type: O Positive  Rubella IgG: RPR: Negative                          13.6   8.12 >-----------< 153    (  @ 22:48 )             41.6          TPro  7.0  /  Alb  3.5  /  TBili  1.0  /  DBili  0.6<H>  /  AST  55<H>  /  ALT  128<H>  /  AlkPhos  401<H>  21 @ 19:44          Physical Exam:  General: NAD  Abdomen: soft, non-tender, non-distended, fundus firm  Vaginal: No heavy vaginal bleeding  Extremities: No erythema, no calf tenderness

## 2021-11-05 NOTE — DISCHARGE NOTE OB - HOSPITAL COURSE
Patient had an uncomplicated  followed by an uncomplicated postpartum course.    EBL: 200  Hct: 41.6    On Postpartum day 2, patient was discharged home in stable condition, voiding spontaneously and with normal vital signs. Patient presented with decreased fetal movement. Fetal status was overall reassuring, but patient was found to be in labor. She subsequently delivered a liveborn male infant by  (, RML laceration). Delivery was complicated by a shoulder dystocia, which resolved with Randall and suprapubic pressure. Patient had an intrapartum temperature that was treated with ampicillin and gentamicin. Peripartum course was also complicated by intrahepatic cholestasis of pregnancy and an associated mild transaminitis. Patient was discharged on PPD#2 hemodynamically stable, clinically well and voiding spontaneously. Patient to follow closely with [facility] low risk clinic for postpartum care.    EBL: 200  Hct: 41.6->27.6  AST/ALT:  Patient presented with decreased fetal movement. Fetal status was overall reassuring, but patient was found to be in labor. She subsequently delivered a liveborn male infant by  (, RML laceration). Delivery was complicated by a shoulder dystocia, which resolved with Randall and suprapubic pressure. Patient had an intrapartum temperature that was treated with ampicillin and gentamicin. Peripartum course was also complicated by intrahepatic cholestasis of pregnancy and an associated mild transaminitis. Patient was discharged on PPD#2 hemodynamically stable, clinically well and voiding spontaneously. Patient to follow closely with [facility] low risk clinic for postpartum care.    EBL: 200  Hct: 41.6->27.6  LFT's down trending WBC down from 20 to 14 and no symptoms of PEC

## 2021-11-05 NOTE — DISCHARGE NOTE OB - CARE PLAN
1 Principal Discharge DX:	Vaginal delivery  Assessment and plan of treatment:	Make your follow-up appointment with your doctor as ordered.   No heavy lifting, driving, or strenuous activity for 6 weeks.  Nothing per vagina such as tampons, intercourse, douches or tub baths for 6 weeks or until you see your doctor.  Call your doctor if you're unable to tolerate food, increase in vaginal bleeding, or have difficulty urinating. Call your doctor if you have pain that is not relieved by your perscribed medications. Notify your doctor with any other concerns.   Principal Discharge DX:	Vaginal delivery  Assessment and plan of treatment:	Make your follow-up appointment with your doctor as ordered.   No heavy lifting, driving, or strenuous activity for 6 weeks.  Nothing per vagina such as tampons, intercourse, douches or tub baths for 6 weeks or until you see your doctor.  Call your doctor if you're unable to tolerate food, increase in vaginal bleeding, or have difficulty urinating. Call your doctor if you have pain that is not relieved by your prescribed medications. Notify your doctor with any other concerns.

## 2021-11-06 ENCOUNTER — TRANSCRIPTION ENCOUNTER (OUTPATIENT)
Age: 26
End: 2021-11-06

## 2021-11-06 VITALS
HEART RATE: 79 BPM | SYSTOLIC BLOOD PRESSURE: 122 MMHG | DIASTOLIC BLOOD PRESSURE: 73 MMHG | TEMPERATURE: 98 F | RESPIRATION RATE: 18 BRPM | OXYGEN SATURATION: 100 %

## 2021-11-06 LAB
ALBUMIN SERPL ELPH-MCNC: 2.9 G/DL — LOW (ref 3.3–5)
ALP SERPL-CCNC: 222 U/L — HIGH (ref 40–120)
ALT FLD-CCNC: 53 U/L — HIGH (ref 4–33)
ANION GAP SERPL CALC-SCNC: 8 MMOL/L — SIGNIFICANT CHANGE UP (ref 7–14)
APTT BLD: 28.7 SEC — SIGNIFICANT CHANGE UP (ref 27–36.3)
AST SERPL-CCNC: 29 U/L — SIGNIFICANT CHANGE UP (ref 4–32)
BASOPHILS # BLD AUTO: 0.02 K/UL — SIGNIFICANT CHANGE UP (ref 0–0.2)
BASOPHILS NFR BLD AUTO: 0.2 % — SIGNIFICANT CHANGE UP (ref 0–2)
BILIRUB SERPL-MCNC: 0.3 MG/DL — SIGNIFICANT CHANGE UP (ref 0.2–1.2)
BILIRUB SERPL-MCNC: 0.4 MG/DL — SIGNIFICANT CHANGE UP (ref 0.2–1.2)
BUN SERPL-MCNC: 10 MG/DL — SIGNIFICANT CHANGE UP (ref 7–23)
CALCIUM SERPL-MCNC: 9 MG/DL — SIGNIFICANT CHANGE UP (ref 8.4–10.5)
CHLORIDE SERPL-SCNC: 107 MMOL/L — SIGNIFICANT CHANGE UP (ref 98–107)
CO2 SERPL-SCNC: 23 MMOL/L — SIGNIFICANT CHANGE UP (ref 22–31)
CREAT SERPL-MCNC: 0.58 MG/DL — SIGNIFICANT CHANGE UP (ref 0.5–1.3)
CREAT SERPL-MCNC: 0.69 MG/DL — SIGNIFICANT CHANGE UP (ref 0.5–1.3)
EOSINOPHIL # BLD AUTO: 0.32 K/UL — SIGNIFICANT CHANGE UP (ref 0–0.5)
EOSINOPHIL NFR BLD AUTO: 2.4 % — SIGNIFICANT CHANGE UP (ref 0–6)
FIBRINOGEN PPP-MCNC: 806 MG/DL — HIGH (ref 290–520)
GLUCOSE SERPL-MCNC: 93 MG/DL — SIGNIFICANT CHANGE UP (ref 70–99)
HCT VFR BLD CALC: 27.2 % — LOW (ref 34.5–45)
HGB BLD-MCNC: 9.1 G/DL — LOW (ref 11.5–15.5)
IANC: 11.29 K/UL — HIGH (ref 1.5–8.5)
IMM GRANULOCYTES NFR BLD AUTO: 1.2 % — SIGNIFICANT CHANGE UP (ref 0–1.5)
INR BLD: 0.87 RATIO — SIGNIFICANT CHANGE UP (ref 0.88–1.16)
INR BLD: <0.9 RATIO — SIGNIFICANT CHANGE UP (ref 0.88–1.16)
LDH SERPL L TO P-CCNC: 227 U/L — HIGH (ref 135–225)
LYMPHOCYTES # BLD AUTO: 0.97 K/UL — LOW (ref 1–3.3)
LYMPHOCYTES # BLD AUTO: 7.3 % — LOW (ref 13–44)
MCHC RBC-ENTMCNC: 33.5 GM/DL — SIGNIFICANT CHANGE UP (ref 32–36)
MCHC RBC-ENTMCNC: 33.5 PG — SIGNIFICANT CHANGE UP (ref 27–34)
MCV RBC AUTO: 100 FL — SIGNIFICANT CHANGE UP (ref 80–100)
MELD SCORE WITH DIALYSIS: 21 POINTS — SIGNIFICANT CHANGE UP
MELD SCORE WITHOUT DIALYSIS: 6 POINTS — SIGNIFICANT CHANGE UP
MONOCYTES # BLD AUTO: 0.5 K/UL — SIGNIFICANT CHANGE UP (ref 0–0.9)
MONOCYTES NFR BLD AUTO: 3.8 % — SIGNIFICANT CHANGE UP (ref 2–14)
NEUTROPHILS # BLD AUTO: 11.29 K/UL — HIGH (ref 1.8–7.4)
NEUTROPHILS NFR BLD AUTO: 85.1 % — HIGH (ref 43–77)
NRBC # BLD: 0 /100 WBCS — SIGNIFICANT CHANGE UP
NRBC # FLD: 0 K/UL — SIGNIFICANT CHANGE UP
PLATELET # BLD AUTO: 162 K/UL — SIGNIFICANT CHANGE UP (ref 150–400)
POTASSIUM SERPL-MCNC: 4.2 MMOL/L — SIGNIFICANT CHANGE UP (ref 3.5–5.3)
POTASSIUM SERPL-SCNC: 4.2 MMOL/L — SIGNIFICANT CHANGE UP (ref 3.5–5.3)
PROT SERPL-MCNC: 5.6 G/DL — LOW (ref 6–8.3)
PROTHROM AB SERPL-ACNC: 10.1 SEC — LOW (ref 10.6–13.6)
PROTHROM AB SERPL-ACNC: 10.2 SEC — LOW (ref 10.6–13.6)
RBC # BLD: 2.72 M/UL — LOW (ref 3.8–5.2)
RBC # FLD: 13.2 % — SIGNIFICANT CHANGE UP (ref 10.3–14.5)
SODIUM SERPL-SCNC: 135 MMOL/L — SIGNIFICANT CHANGE UP (ref 135–145)
SODIUM SERPL-SCNC: 138 MMOL/L — SIGNIFICANT CHANGE UP (ref 135–145)
URATE SERPL-MCNC: 6.1 MG/DL — SIGNIFICANT CHANGE UP (ref 2.5–7)
WBC # BLD: 13.26 K/UL — HIGH (ref 3.8–10.5)
WBC # FLD AUTO: 13.26 K/UL — HIGH (ref 3.8–10.5)

## 2021-11-06 RX ORDER — SIMETHICONE 80 MG/1
1 TABLET, CHEWABLE ORAL
Qty: 0 | Refills: 0 | DISCHARGE
Start: 2021-11-06

## 2021-11-06 RX ORDER — AER TRAVELER 0.5 G/1
1 SOLUTION RECTAL; TOPICAL
Qty: 0 | Refills: 0 | DISCHARGE
Start: 2021-11-06

## 2021-11-06 RX ORDER — LANOLIN
1 OINTMENT (GRAM) TOPICAL
Qty: 0 | Refills: 0 | DISCHARGE
Start: 2021-11-06

## 2021-11-06 RX ORDER — NORETHINDRONE 0.35 MG/1
1 TABLET ORAL
Qty: 90 | Refills: 5
Start: 2021-11-06

## 2021-11-06 RX ADMIN — Medication 1 TABLET(S): at 13:27

## 2021-11-06 RX ADMIN — Medication 600 MILLIGRAM(S): at 07:37

## 2021-11-06 RX ADMIN — Medication 600 MILLIGRAM(S): at 14:00

## 2021-11-06 RX ADMIN — Medication 975 MILLIGRAM(S): at 02:00

## 2021-11-06 RX ADMIN — Medication 600 MILLIGRAM(S): at 06:26

## 2021-11-06 RX ADMIN — Medication 975 MILLIGRAM(S): at 10:30

## 2021-11-06 RX ADMIN — MAGNESIUM HYDROXIDE 30 MILLILITER(S): 400 TABLET, CHEWABLE ORAL at 01:15

## 2021-11-06 RX ADMIN — Medication 975 MILLIGRAM(S): at 01:14

## 2021-11-06 RX ADMIN — Medication 975 MILLIGRAM(S): at 09:56

## 2021-11-06 RX ADMIN — Medication 600 MILLIGRAM(S): at 13:26

## 2021-11-06 NOTE — DISCHARGE NOTE NURSING/CASE MANAGEMENT/SOCIAL WORK - PATIENT PORTAL LINK FT
You can access the FollowMyHealth Patient Portal offered by [facility] by registering at the following website: [email or web address]. By joining [employer]’s FollowMyHealth portal, you will also be able to view your health information using other applications (apps) compatible with our system.

## 2021-11-06 NOTE — PROGRESS NOTE ADULT - ASSESSMENT
A/P: 25yo PPD#2 s/p  c/b chorio.  Patient is stable and doing well post-partum.    - Pain well controlled, continue current pain regimen  - Increase ambulation, SCDs when not ambulating  - Continue regular diet  - Discharge planning     #chorio, resolved  - Afebrile, asymptomatic s/p A/G/T    VIKTORIA Mehta PGY-1 A/P: 27yo PPD#2 s/p  c/b chorio.  Patient is stable and doing well post-partum.    - Pain well controlled, continue current pain regimen  - Increase ambulation, SCDs when not ambulating  - Continue regular diet  - Discharge planning     #chorio, resolved  - Afebrile, asymptomatic s/p A/G/T    #gHTN  - Pt met criteria during labor course  - STAT HELLP labs ordered, showed downtrending AST/ALT    R. Mehta PGY-1 A/P: 25yo PPD#2 s/p  c/b chorio.  Patient is stable and doing well post-partum.    - Pain well controlled, continue current pain regimen  - Increase ambulation, SCDs when not ambulating  - Continue regular diet  - Discharge planning     #chorio, resolved  - Afebrile, asymptomatic s/p A/G/T    # Gest. HTN  - Pt met criteria during labor course  - STAT HELLP labs ordered, showed downtrending AST/ALT    RYolsi Mehta PGY-1    Attending note   I examined and evaluated the patient   BP all normal range today, no symptoms of PEC today   Repeat labs with WBC = down from 20 to 14  and LFT's downtrending   meets criteria for discharge d/c to home  C Adithya

## 2021-11-06 NOTE — PROGRESS NOTE ADULT - SUBJECTIVE AND OBJECTIVE BOX
OB Progress Note:  PPD#2    S: 27yo  PPD#2 s/p  c/b chorio s/p A/G/T. Patient feels well. Pain is well controlled. She is tolerating a regular diet and passing flatus. She is voiding spontaneously, and ambulating without difficulty. Denies CP/SOB. Denies HA/lightheadedness/dizziness. Denies N/V. Denies calf pain    O:  Vitals:   Vital Signs Last 24 Hrs  T(C): 36.4 (2021 06:22), Max: 36.9 (2021 09:20)  T(F): 97.5 (2021 06:22), Max: 98.4 (2021 09:20)  HR: 73 (2021 06:22) (73 - 79)  BP: 105/65 (2021 06:22) (101/61 - 122/68)  BP(mean): --  RR: 18 (2021 06:22) (18 - 18)  SpO2: 100% (2021 06:22) (99% - 100%)    MEDICATIONS  (STANDING):  acetaminophen     Tablet .. 650 milliGRAM(s) Oral once  acetaminophen     Tablet .. 975 milliGRAM(s) Oral <User Schedule>  diphtheria/tetanus/pertussis (acellular) Vaccine (ADAcel) 0.5 milliLiter(s) IntraMuscular once  ibuprofen  Tablet. 600 milliGRAM(s) Oral every 6 hours  influenza   Vaccine 0.5 milliLiter(s) IntraMuscular once  lactated ringers Bolus 1000 milliLiter(s) IV Bolus once  prenatal multivitamin 1 Tablet(s) Oral daily  sodium chloride 0.9% lock flush 3 milliLiter(s) IV Push every 8 hours    MEDICATIONS  (PRN):  benzocaine 20%/menthol 0.5% Spray 1 Spray(s) Topical every 6 hours PRN for Perineal discomfort  dibucaine 1% Ointment 1 Application(s) Topical every 6 hours PRN Perineal discomfort  diphenhydrAMINE 25 milliGRAM(s) Oral every 6 hours PRN Pruritus  hydrocortisone 1% Cream 1 Application(s) Topical every 6 hours PRN Moderate Pain (4-6)  lanolin Ointment 1 Application(s) Topical every 6 hours PRN nipple soreness  magnesium hydroxide Suspension 30 milliLiter(s) Oral two times a day PRN Constipation  oxyCODONE    IR 5 milliGRAM(s) Oral every 3 hours PRN Moderate to Severe Pain (4-10)  oxyCODONE    IR 5 milliGRAM(s) Oral once PRN Moderate to Severe Pain (4-10)  pramoxine 1%/zinc 5% Cream 1 Application(s) Topical every 4 hours PRN Moderate Pain (4-6)  simethicone 80 milliGRAM(s) Chew every 4 hours PRN Gas  witch hazel Pads 1 Application(s) Topical every 4 hours PRN Perineal discomfort      Labs:  Blood type: O Positive  Rubella IgG: RPR: Negative                          9.2<L>   20.16<H> >-----------< 131<L>    (  @ 18:33 )             27.6<L>                        13.6   8.12 >-----------< 153    (  @ 22:48 )             41.6          TPro  5.2<L>  /  Alb  2.5<L>  /  TBili  0.4  /  DBili  0.2  /  AST  33<H>  /  ALT  60<H>  /  AlkPhos  228<H>  21 @ 18:33          Physical Exam:  General: NAD  Abdomen: soft, non-tender, non-distended, fundus firm  Vaginal: lochia wnl, exam deferred  Extremities: No erythema/calf tenderness

## 2021-11-08 LAB — BILE AC FLD-MCNC: 43.9 UMOL/L — HIGH (ref 0–10)

## 2021-12-16 ENCOUNTER — OUTPATIENT (OUTPATIENT)
Dept: OUTPATIENT SERVICES | Facility: HOSPITAL | Age: 26
LOS: 1 days | End: 2021-12-16

## 2021-12-16 ENCOUNTER — APPOINTMENT (OUTPATIENT)
Dept: OBGYN | Facility: HOSPITAL | Age: 26
End: 2021-12-16

## 2021-12-16 ENCOUNTER — RESULT REVIEW (OUTPATIENT)
Age: 26
End: 2021-12-16

## 2021-12-16 VITALS
TEMPERATURE: 98.1 F | HEART RATE: 75 BPM | HEIGHT: 64 IN | BODY MASS INDEX: 24.92 KG/M2 | WEIGHT: 146 LBS | DIASTOLIC BLOOD PRESSURE: 63 MMHG | SYSTOLIC BLOOD PRESSURE: 104 MMHG

## 2021-12-16 DIAGNOSIS — D64.9 ANEMIA, UNSPECIFIED: ICD-10-CM

## 2021-12-16 DIAGNOSIS — R87.610 ATYPICAL SQUAMOUS CELLS OF UNDETERMINED SIGNIFICANCE ON CYTOLOGIC SMEAR OF CERVIX (ASC-US): ICD-10-CM

## 2021-12-16 DIAGNOSIS — R74.8 ABNORMAL LEVELS OF OTHER SERUM ENZYMES: ICD-10-CM

## 2021-12-16 DIAGNOSIS — Z72.51 HIGH RISK HETEROSEXUAL BEHAVIOR: ICD-10-CM

## 2021-12-16 DIAGNOSIS — Z34.93 ENCOUNTER FOR SUPERVISION OF NORMAL PREGNANCY, UNSPECIFIED, THIRD TRIMESTER: ICD-10-CM

## 2021-12-16 LAB
ALBUMIN SERPL ELPH-MCNC: 4.5 G/DL — SIGNIFICANT CHANGE UP (ref 3.3–5)
ALP SERPL-CCNC: 129 U/L — HIGH (ref 40–120)
ALT FLD-CCNC: 20 U/L — SIGNIFICANT CHANGE UP (ref 4–33)
ANION GAP SERPL CALC-SCNC: 9 MMOL/L — SIGNIFICANT CHANGE UP (ref 7–14)
AST SERPL-CCNC: 19 U/L — SIGNIFICANT CHANGE UP (ref 4–32)
BASOPHILS # BLD AUTO: 0.02 K/UL — SIGNIFICANT CHANGE UP (ref 0–0.2)
BASOPHILS NFR BLD AUTO: 0.4 % — SIGNIFICANT CHANGE UP (ref 0–2)
BILIRUB SERPL-MCNC: 0.4 MG/DL — SIGNIFICANT CHANGE UP (ref 0.2–1.2)
BUN SERPL-MCNC: 12 MG/DL — SIGNIFICANT CHANGE UP (ref 7–23)
CALCIUM SERPL-MCNC: 9.3 MG/DL — SIGNIFICANT CHANGE UP (ref 8.4–10.5)
CHLORIDE SERPL-SCNC: 106 MMOL/L — SIGNIFICANT CHANGE UP (ref 98–107)
CO2 SERPL-SCNC: 25 MMOL/L — SIGNIFICANT CHANGE UP (ref 22–31)
CREAT SERPL-MCNC: 0.7 MG/DL — SIGNIFICANT CHANGE UP (ref 0.5–1.3)
EOSINOPHIL # BLD AUTO: 0.2 K/UL — SIGNIFICANT CHANGE UP (ref 0–0.5)
EOSINOPHIL NFR BLD AUTO: 4.3 % — SIGNIFICANT CHANGE UP (ref 0–6)
GLUCOSE SERPL-MCNC: 82 MG/DL — SIGNIFICANT CHANGE UP (ref 70–99)
HCT VFR BLD CALC: 39.1 % — SIGNIFICANT CHANGE UP (ref 34.5–45)
HGB BLD-MCNC: 12.8 G/DL — SIGNIFICANT CHANGE UP (ref 11.5–15.5)
IANC: 2.72 K/UL — SIGNIFICANT CHANGE UP (ref 1.5–8.5)
IMM GRANULOCYTES NFR BLD AUTO: 0.2 % — SIGNIFICANT CHANGE UP (ref 0–1.5)
LYMPHOCYTES # BLD AUTO: 1.35 K/UL — SIGNIFICANT CHANGE UP (ref 1–3.3)
LYMPHOCYTES # BLD AUTO: 29.3 % — SIGNIFICANT CHANGE UP (ref 13–44)
MCHC RBC-ENTMCNC: 31.3 PG — SIGNIFICANT CHANGE UP (ref 27–34)
MCHC RBC-ENTMCNC: 32.7 GM/DL — SIGNIFICANT CHANGE UP (ref 32–36)
MCV RBC AUTO: 95.6 FL — SIGNIFICANT CHANGE UP (ref 80–100)
MONOCYTES # BLD AUTO: 0.3 K/UL — SIGNIFICANT CHANGE UP (ref 0–0.9)
MONOCYTES NFR BLD AUTO: 6.5 % — SIGNIFICANT CHANGE UP (ref 2–14)
NEUTROPHILS # BLD AUTO: 2.72 K/UL — SIGNIFICANT CHANGE UP (ref 1.8–7.4)
NEUTROPHILS NFR BLD AUTO: 59.3 % — SIGNIFICANT CHANGE UP (ref 43–77)
NRBC # BLD: 0 /100 WBCS — SIGNIFICANT CHANGE UP
NRBC # FLD: 0 K/UL — SIGNIFICANT CHANGE UP
PLATELET # BLD AUTO: 241 K/UL — SIGNIFICANT CHANGE UP (ref 150–400)
POTASSIUM SERPL-MCNC: 4.3 MMOL/L — SIGNIFICANT CHANGE UP (ref 3.5–5.3)
POTASSIUM SERPL-SCNC: 4.3 MMOL/L — SIGNIFICANT CHANGE UP (ref 3.5–5.3)
PROT SERPL-MCNC: 6.9 G/DL — SIGNIFICANT CHANGE UP (ref 6–8.3)
RBC # BLD: 4.09 M/UL — SIGNIFICANT CHANGE UP (ref 3.8–5.2)
RBC # FLD: 11.8 % — SIGNIFICANT CHANGE UP (ref 10.3–14.5)
SODIUM SERPL-SCNC: 140 MMOL/L — SIGNIFICANT CHANGE UP (ref 135–145)
WBC # BLD: 4.6 K/UL — SIGNIFICANT CHANGE UP (ref 3.8–10.5)
WBC # FLD AUTO: 4.6 K/UL — SIGNIFICANT CHANGE UP (ref 3.8–10.5)

## 2021-12-16 RX ADMIN — LEVONORGESTREL 1 MG: 1.5 TABLET ORAL at 00:00

## 2021-12-16 NOTE — HISTORY OF PRESENT ILLNESS
[Complications:___] : complications include: [unfilled] [Delivery Date: ___] : on [unfilled] [] : delivered by vaginal delivery [Male] : Delivery History: baby boy [Breastfeeding] : currently nursing [Discharge HCT: ___] : hematocrit level was [unfilled] [Discharge HGB: ___] : hemoglobin level was [unfilled] [Resumed Barnard] : has resumed intercourse [Intended Contraception] : Intended Contraception: [Back to Normal] : is back to normal in size [None] : no vaginal bleeding [Normal] : the vagina was normal [Healing Well] : is healing well [Not Done] : Examination of breasts not done [Rhogam] : Rhogam was not administered [Rubella Vaccine] : Rubella vaccine was not administered [Pertussis Vaccine] : Pertussis vaccine was not administered [BTL] : no tubal ligation [Resumed Menses] : has not resumed her menses [Cervix Sample Taken] : cervical sample not taken for a Pap smear [FreeTextEntry8] :  # [id number] PP vaginal delivery, c/o suprapubic pain that has improved since delivery 'but not improving anymore ' [FreeTextEntry9] : Cholestasis of pregnancy, bile acids 110, elevated LFTs [de-identified] : suprapubic pain at the symphysis  [de-identified] : Ascus +HPV on pap from outside facility. Unprotected sex yesterday. [de-identified] : CBC, CMP today. Plan B given today - plan for Nexplanon placement 2 weeks - advised no intercourse until this visit. Advised use of postpartum support garment in addition to tylenol/advil for pubic symphysis pain - to be referred to ortho clinic if no improvement. Patient to scheduled for colposcopy clinic for abnormal pap result.

## 2021-12-17 ENCOUNTER — NON-APPOINTMENT (OUTPATIENT)
Age: 26
End: 2021-12-17

## 2021-12-17 RX ORDER — ACETAMINOPHEN 500 MG
2 TABLET ORAL
Qty: 0 | Refills: 0 | DISCHARGE

## 2021-12-17 RX ORDER — FERROUS SULFATE 325(65) MG
0 TABLET ORAL
Qty: 0 | Refills: 0 | DISCHARGE

## 2021-12-17 RX ORDER — IBUPROFEN 200 MG
1 TABLET ORAL
Qty: 0 | Refills: 0 | DISCHARGE

## 2021-12-28 DIAGNOSIS — R87.610 ATYPICAL SQUAMOUS CELLS OF UNDETERMINED SIGNIFICANCE ON CYTOLOGIC SMEAR OF CERVIX (ASC-US): ICD-10-CM

## 2021-12-28 DIAGNOSIS — R74.8 ABNORMAL LEVELS OF OTHER SERUM ENZYMES: ICD-10-CM

## 2021-12-28 DIAGNOSIS — Z72.51 HIGH RISK HETEROSEXUAL BEHAVIOR: ICD-10-CM

## 2021-12-28 DIAGNOSIS — D64.9 ANEMIA, UNSPECIFIED: ICD-10-CM

## 2022-04-27 NOTE — OB RN DELIVERY SUMMARY - NS_GBSRECENTABX_OBGYN_ALL_OB_DT
Ana Johnson is a 52 year old female presenting for following from a hospital visit x 2 days.     Medications verified, pharmacy verified     Allergies verified     Recent PHQ 2/9 Score    PHQ 2:  Date Adult PHQ 2 Score Adult PHQ 2 Interpretation   2/2/2022 0 No further screening needed       PHQ 9:  Date Adult PHQ 9 Score Adult PHQ 9 Interpretation   8/26/2019 18 Moderately Severe Depression        Health Maintenance Due   Topic Date Due   • Hepatitis B Vaccine (1 of 3 - Risk 3-dose series) Never done   • Pneumococcal Vaccine 0-64 (2 - PCV) 09/25/2019   • Diabetes Eye Exam  10/01/2020   • Shingles Vaccine (1 of 2) Never done   • COVID-19 Vaccine (3 - Pfizer risk 4-dose series) 07/16/2021       Patient is due for topics as listed above but is not proceeding with Immunization(s) Hep B, Pneumococcal and Shingles at this time.       Patient would like communication of their results via:        Cell Phone:   Telephone Information:   Mobile [phone]     Okay to leave a message containing results? Yes      04-Nov-2021 18:38

## 2022-09-20 RX ORDER — LEVONORGESTREL 1.5 MG/1
1.5 TABLET ORAL
Qty: 0 | Refills: 0 | Status: COMPLETED | OUTPATIENT
Start: 2021-12-16

## 2023-02-09 NOTE — ED PROCEDURE NOTE - CPROC ED INFORMED CONSENT1
Benefits, risks, and possible complications of procedure explained to patient/caregiver who verbalized understanding and gave verbal consent. Initiate Treatment: Bactrim DS- take 1 tablet by mouth once daily Detail Level: Zone Plan: Discussed with patient and her mother starting Accutane. Patient will need to start on birth control. She was given Bactrim to take in the meantime until she has been on birth control and has completed her labs to be able to start Accutane

## 2023-04-26 NOTE — ED PROVIDER NOTE - RESPIRATORY [+], MLM
COUGH/SHORTNESS OF BREATH Bill For Surgical Tray: no Performing Laboratory: 0 Billing Type: [employer] Expected Date Of Service: 04/26/2023

## 2023-07-26 NOTE — OB RN PATIENT PROFILE - PATIENT/CAREGIVER ACCEPTED INTERPRETER SERVICES
This was a shared visit with the MILLIE. I reviewed and verified the documentation and independently performed the documented: yes

## 2024-03-26 NOTE — OB RN PATIENT PROFILE - PATIENT REPRESENTATIVE: ( YOU CAN CHOOSE ANY PERSON THAT CAN ASSIST YOU WITH YOUR HEALTH CARE PREFERENCES, DOES NOT HAVE TO BE A SPOUSE, IMMEDIATE FAMILY OR SIGNIFICANT OTHER/PARTNER)
Fax received from [facility] stating fluticasone-umeclidin-vilanterol (TRELEGY ELLIPTA) 100-62.5-25 MCG/ACT inhaler will cost patient almost $200.     Prescribe alternative? Pharmacy verified.    Declines

## 2025-01-24 NOTE — OB PROVIDER IHI INDUCTION/AUGMENTATION NOTE - NSNOTECOMPLETE_OBGYN_ALL_OB
OP SW consulted by provider to assist with community resources.  PT came into the office today with a  assistance from [facility] CYS.  PT is currently living with her grandfather.      OP SW went into the exam with provider, PT and  assistance from [facility].  OP SW was notified that PT is in [facility] care with grandfather.  Paperwork was provided and placed int he chart.  PT is behind in her well visits.  This was completed today.  PT was informed that vaccines can be done.  OP [facility]  PT is attending [employer].  No issues with school.  PT did mention having some belly/urination pain.  PT did not display any MH concerns.      OP SW telephone CYS care worker.  OP SW notified that  that PT is due for flu vaccine.  Mother still has parental rights.  CYS worker will discuss with her.  PT will need to follow up in 1 week for some respiratory concerns.  PT going home with an inhaler and 2 prescriptions for her eczema.   will need the D/C summary and office notes.    No other concerns at this time.  OP SW will continue to follow to determine if additional resources are needed after transfer of care.  
Yes